# Patient Record
Sex: FEMALE | Race: WHITE | NOT HISPANIC OR LATINO | Employment: FULL TIME | URBAN - NONMETROPOLITAN AREA
[De-identification: names, ages, dates, MRNs, and addresses within clinical notes are randomized per-mention and may not be internally consistent; named-entity substitution may affect disease eponyms.]

---

## 2017-05-23 ENCOUNTER — APPOINTMENT (OUTPATIENT)
Dept: LAB | Facility: HOSPITAL | Age: 57
End: 2017-05-23
Payer: COMMERCIAL

## 2017-05-23 ENCOUNTER — TRANSCRIBE ORDERS (OUTPATIENT)
Dept: ADMINISTRATIVE | Facility: HOSPITAL | Age: 57
End: 2017-05-23

## 2017-05-23 DIAGNOSIS — B34.9 VIRAL SYNDROME: ICD-10-CM

## 2017-05-23 DIAGNOSIS — R53.83 FATIGUE, UNSPECIFIED TYPE: Primary | ICD-10-CM

## 2017-05-23 DIAGNOSIS — R53.83 FATIGUE, UNSPECIFIED TYPE: ICD-10-CM

## 2017-05-23 LAB
25(OH)D3 SERPL-MCNC: 16.5 NG/ML (ref 30–100)
ALBUMIN SERPL BCP-MCNC: 4.5 G/DL (ref 3.5–5)
ALP SERPL-CCNC: 41 U/L (ref 46–116)
ALT SERPL W P-5'-P-CCNC: 25 U/L (ref 12–78)
ANION GAP SERPL CALCULATED.3IONS-SCNC: 8 MMOL/L (ref 4–13)
AST SERPL W P-5'-P-CCNC: 23 U/L (ref 5–45)
BILIRUB SERPL-MCNC: 0.4 MG/DL (ref 0.2–1)
BUN SERPL-MCNC: 14 MG/DL (ref 5–25)
CALCIUM SERPL-MCNC: 9.3 MG/DL (ref 8.3–10.1)
CHLORIDE SERPL-SCNC: 101 MMOL/L (ref 100–108)
CO2 SERPL-SCNC: 31 MMOL/L (ref 21–32)
CREAT SERPL-MCNC: 0.77 MG/DL (ref 0.6–1.3)
GFR SERPL CREATININE-BSD FRML MDRD: >60 ML/MIN/1.73SQ M
GLUCOSE SERPL-MCNC: 90 MG/DL (ref 65–140)
POTASSIUM SERPL-SCNC: 4.2 MMOL/L (ref 3.5–5.3)
PROT SERPL-MCNC: 7.4 G/DL (ref 6.4–8.2)
SODIUM SERPL-SCNC: 140 MMOL/L (ref 136–145)
TSH SERPL DL<=0.05 MIU/L-ACNC: 2.97 UIU/ML (ref 0.36–3.74)

## 2017-05-23 PROCEDURE — 86618 LYME DISEASE ANTIBODY: CPT

## 2017-05-23 PROCEDURE — 86645 CMV ANTIBODY IGM: CPT

## 2017-05-23 PROCEDURE — 86664 EPSTEIN-BARR NUCLEAR ANTIGEN: CPT

## 2017-05-23 PROCEDURE — 86665 EPSTEIN-BARR CAPSID VCA: CPT

## 2017-05-23 PROCEDURE — 82306 VITAMIN D 25 HYDROXY: CPT

## 2017-05-23 PROCEDURE — 36415 COLL VENOUS BLD VENIPUNCTURE: CPT

## 2017-05-23 PROCEDURE — 80053 COMPREHEN METABOLIC PANEL: CPT

## 2017-05-23 PROCEDURE — 86617 LYME DISEASE ANTIBODY: CPT

## 2017-05-23 PROCEDURE — 86738 MYCOPLASMA ANTIBODY: CPT

## 2017-05-23 PROCEDURE — 86644 CMV ANTIBODY: CPT

## 2017-05-23 PROCEDURE — 86663 EPSTEIN-BARR ANTIBODY: CPT

## 2017-05-23 PROCEDURE — 84443 ASSAY THYROID STIM HORMONE: CPT

## 2017-05-24 LAB
B BURGDOR IGG SER IA-ACNC: 0.11
B BURGDOR IGM SER IA-ACNC: 1.34

## 2017-05-25 LAB
CMV IGG SERPL IA-ACNC: <0.6 U/ML (ref 0–0.59)
CMV IGM SERPL IA-ACNC: <30 AU/ML (ref 0–29.9)
EBV EA IGG SER-ACNC: 17.2 U/ML (ref 0–8.9)
EBV NA IGG SER IA-ACNC: >600 U/ML (ref 0–17.9)
EBV PATRN SPEC IB-IMP: ABNORMAL
EBV VCA IGG SER IA-ACNC: 215 U/ML (ref 0–17.9)
EBV VCA IGM SER IA-ACNC: <36 U/ML (ref 0–35.9)

## 2017-05-26 LAB
M PNEUMO IGG SER IA-ACNC: 116 U/ML (ref 0–99)
M PNEUMO IGM SER IA-ACNC: <770 U/ML (ref 0–769)

## 2017-05-27 LAB

## 2020-06-12 ENCOUNTER — OFFICE VISIT (OUTPATIENT)
Dept: FAMILY MEDICINE CLINIC | Facility: CLINIC | Age: 60
End: 2020-06-12
Payer: COMMERCIAL

## 2020-06-12 VITALS
TEMPERATURE: 98.7 F | WEIGHT: 133 LBS | BODY MASS INDEX: 22.71 KG/M2 | HEART RATE: 68 BPM | SYSTOLIC BLOOD PRESSURE: 124 MMHG | HEIGHT: 64 IN | DIASTOLIC BLOOD PRESSURE: 66 MMHG | RESPIRATION RATE: 16 BRPM

## 2020-06-12 DIAGNOSIS — D17.0 LIPOMA OF NECK: ICD-10-CM

## 2020-06-12 DIAGNOSIS — H66.90 ACUTE OTITIS MEDIA, UNSPECIFIED OTITIS MEDIA TYPE: Primary | ICD-10-CM

## 2020-06-12 PROCEDURE — 99213 OFFICE O/P EST LOW 20 MIN: CPT | Performed by: FAMILY MEDICINE

## 2020-06-12 PROCEDURE — 1036F TOBACCO NON-USER: CPT | Performed by: FAMILY MEDICINE

## 2020-06-12 PROCEDURE — 3008F BODY MASS INDEX DOCD: CPT | Performed by: FAMILY MEDICINE

## 2020-06-12 RX ORDER — AZITHROMYCIN 250 MG/1
TABLET, FILM COATED ORAL
Qty: 6 TABLET | Refills: 0 | Status: SHIPPED | OUTPATIENT
Start: 2020-06-12 | End: 2020-06-17

## 2020-06-12 RX ORDER — METHYLPREDNISOLONE 4 MG/1
TABLET ORAL
Qty: 21 EACH | Refills: 0 | Status: SHIPPED | OUTPATIENT
Start: 2020-06-12 | End: 2020-07-24

## 2020-06-29 DIAGNOSIS — D17.0 LIPOMA OF NECK: Primary | ICD-10-CM

## 2020-07-07 ENCOUNTER — CONSULT (OUTPATIENT)
Dept: SURGERY | Facility: CLINIC | Age: 60
End: 2020-07-07
Payer: COMMERCIAL

## 2020-07-07 VITALS
SYSTOLIC BLOOD PRESSURE: 128 MMHG | WEIGHT: 134 LBS | HEART RATE: 84 BPM | BODY MASS INDEX: 22.88 KG/M2 | HEIGHT: 64 IN | DIASTOLIC BLOOD PRESSURE: 79 MMHG | TEMPERATURE: 98.6 F

## 2020-07-07 DIAGNOSIS — R22.9 MASS OF SUBCUTANEOUS TISSUE: Primary | ICD-10-CM

## 2020-07-07 DIAGNOSIS — D17.0 LIPOMA OF NECK: ICD-10-CM

## 2020-07-07 PROBLEM — D17.21 LIPOMA OF RIGHT SHOULDER: Status: ACTIVE | Noted: 2020-07-07

## 2020-07-07 PROCEDURE — 99244 OFF/OP CNSLTJ NEW/EST MOD 40: CPT | Performed by: SURGERY

## 2020-07-07 RX ORDER — SODIUM CHLORIDE, SODIUM LACTATE, POTASSIUM CHLORIDE, CALCIUM CHLORIDE 600; 310; 30; 20 MG/100ML; MG/100ML; MG/100ML; MG/100ML
125 INJECTION, SOLUTION INTRAVENOUS CONTINUOUS
Status: CANCELLED | OUTPATIENT
Start: 2020-07-07

## 2020-07-07 RX ORDER — CHLORHEXIDINE GLUCONATE 4 G/100ML
SOLUTION TOPICAL DAILY PRN
Status: CANCELLED | OUTPATIENT
Start: 2020-07-07

## 2020-07-07 RX ORDER — ALBUTEROL SULFATE 90 UG/1
AEROSOL, METERED RESPIRATORY (INHALATION)
COMMUNITY
Start: 2016-08-24 | End: 2021-04-01 | Stop reason: SDUPTHER

## 2020-07-08 NOTE — H&P (VIEW-ONLY)
Assessment/Plan:    Mass of subcutaneous tissue of left posterior shoulder  Very large symptomatic subcutaneous mass, possibly lipoma, of the left posterior shoulder which is growing in size  Currently measures approximately 10 x 10 cm  No overlying skin changes  Does cause significant pain to the right scapula and right trapezius area  Patient wished to have this removed  I think given chronic pain requiring a cow apractic treatment plus the increase in size both merit the need for excisional biopsy  Patient will be scheduled for excision of subcutaneous mass  The procedure itself including all associated risks and benefits were discussed the patient great length  The patient verbalized understands risks is the way to proceed, consent was signed  Diagnoses and all orders for this visit:    Mass of subcutaneous tissue of left posterior shoulder    Lipoma of neck  -     Ambulatory referral to General Surgery    Other orders  -     albuterol (ProAir HFA) 90 mcg/act inhaler; Inhale          Subjective:      Patient ID: Toñito Florez is a 62 y o  female  73-year-old female with no significant past medical history, presents today in consultation for evaluation of enlarging subcutaneous mass of the right posterior shoulder  Patient states she has had this lesion for some time however recently with a past year is grown in size  She also has significant pain and discomfort surrounding lesion along her right shoulder blade and also along the right inferior portion of her neck and right shoulder  She currently sees a chiropractor with minimal improvement  At this point she is very concerned that is the mass that is can not contribute to her chronic pain  She denies any overlying skin changes  No drainage from the area  No similar masses in the past   Denies any other associated symptoms  This concerns her as it is growing in size over the past year  She would like it removed    Denies any other symptoms such as shortness of breath or chest pain  She is able to climb a set of stairs without any complication  She tolerates regular diet  No abdominal pain  No nausea vomiting  No fevers or chills  The following portions of the patient's history were reviewed and updated as appropriate:   She  has a past medical history of Allergies and Asthma  She   Patient Active Problem List    Diagnosis Date Noted    Mass of subcutaneous tissue of left posterior shoulder 07/07/2020     She  has a past surgical history that includes TONSILLECTOMY and Knee Arthroplasty  Her family history is not on file  She  reports that she has never smoked  She has never used smokeless tobacco  She reports that she does not use drugs  Her alcohol history is not on file  Current Outpatient Medications   Medication Sig Dispense Refill    albuterol (ProAir HFA) 90 mcg/act inhaler Inhale      budesonide (RINOCORT AQUA) 32 MCG/ACT nasal spray 1 spray into each nostril daily      methylPREDNISolone 4 MG tablet therapy pack Use as directed on package (Patient not taking: Reported on 7/7/2020) 21 each 0     No current facility-administered medications for this visit  She is allergic to albumen, egg; clarithromycin; penicillin g; penicillins; quinolones; and sulfa antibiotics       Review of Systems      A review systems was completed, all negative except as noted above HPI  Objective:      /79   Pulse 84   Temp 98 6 °F (37 °C)   Ht 5' 4" (1 626 m)   Wt 60 8 kg (134 lb)   BMI 23 00 kg/m²           Physical Exam   Constitutional: She is oriented to person, place, and time  She appears well-developed and well-nourished  No distress  HENT:   Head: Normocephalic and atraumatic  Eyes: No scleral icterus  Neck: Normal range of motion  No tracheal deviation present  Cardiovascular: Normal rate, regular rhythm and normal heart sounds  Exam reveals no gallop and no friction rub     No murmur heard   Pulmonary/Chest: Effort normal and breath sounds normal  No stridor  No respiratory distress  She has no wheezes  She has no rales  She exhibits no tenderness  Abdominal: Soft  Bowel sounds are normal  She exhibits no distension and no mass  There is no tenderness  There is no rebound and no guarding  Musculoskeletal: Normal range of motion  She exhibits no edema, tenderness or deformity  Neurological: She is alert and oriented to person, place, and time  No cranial nerve deficit  Coordination normal    Skin: Skin is warm  No rash noted  She is not diaphoretic  No erythema  No pallor  A 10 x 10 cm subcutaneous mass of the right posterior shoulder noted  Tender to palpation mostly in the inferior aspect just above the spine of this right scapula  Mildly fixed  No overlying skin changes  Psychiatric: She has a normal mood and affect  Her behavior is normal  Judgment and thought content normal    Vitals reviewed

## 2020-07-08 NOTE — ASSESSMENT & PLAN NOTE
Very large symptomatic subcutaneous mass, possibly lipoma, of the left posterior shoulder which is growing in size  Currently measures approximately 10 x 10 cm  No overlying skin changes  Does cause significant pain to the right scapula and right trapezius area  Patient wished to have this removed  I think given chronic pain requiring a cow apractic treatment plus the increase in size both merit the need for excisional biopsy  Patient will be scheduled for excision of subcutaneous mass  The procedure itself including all associated risks and benefits were discussed the patient great length  The patient verbalized understands risks is the way to proceed, consent was signed

## 2020-07-08 NOTE — PROGRESS NOTES
Assessment/Plan:    Mass of subcutaneous tissue of left posterior shoulder  Very large symptomatic subcutaneous mass, possibly lipoma, of the left posterior shoulder which is growing in size  Currently measures approximately 10 x 10 cm  No overlying skin changes  Does cause significant pain to the right scapula and right trapezius area  Patient wished to have this removed  I think given chronic pain requiring a cow apractic treatment plus the increase in size both merit the need for excisional biopsy  Patient will be scheduled for excision of subcutaneous mass  The procedure itself including all associated risks and benefits were discussed the patient great length  The patient verbalized understands risks is the way to proceed, consent was signed  Diagnoses and all orders for this visit:    Mass of subcutaneous tissue of left posterior shoulder    Lipoma of neck  -     Ambulatory referral to General Surgery    Other orders  -     albuterol (ProAir HFA) 90 mcg/act inhaler; Inhale          Subjective:      Patient ID: Brittany Abarca is a 62 y o  female  28-year-old female with no significant past medical history, presents today in consultation for evaluation of enlarging subcutaneous mass of the right posterior shoulder  Patient states she has had this lesion for some time however recently with a past year is grown in size  She also has significant pain and discomfort surrounding lesion along her right shoulder blade and also along the right inferior portion of her neck and right shoulder  She currently sees a chiropractor with minimal improvement  At this point she is very concerned that is the mass that is can not contribute to her chronic pain  She denies any overlying skin changes  No drainage from the area  No similar masses in the past   Denies any other associated symptoms  This concerns her as it is growing in size over the past year  She would like it removed    Denies any other symptoms such as shortness of breath or chest pain  She is able to climb a set of stairs without any complication  She tolerates regular diet  No abdominal pain  No nausea vomiting  No fevers or chills  The following portions of the patient's history were reviewed and updated as appropriate:   She  has a past medical history of Allergies and Asthma  She   Patient Active Problem List    Diagnosis Date Noted    Mass of subcutaneous tissue of left posterior shoulder 07/07/2020     She  has a past surgical history that includes TONSILLECTOMY and Knee Arthroplasty  Her family history is not on file  She  reports that she has never smoked  She has never used smokeless tobacco  She reports that she does not use drugs  Her alcohol history is not on file  Current Outpatient Medications   Medication Sig Dispense Refill    albuterol (ProAir HFA) 90 mcg/act inhaler Inhale      budesonide (RINOCORT AQUA) 32 MCG/ACT nasal spray 1 spray into each nostril daily      methylPREDNISolone 4 MG tablet therapy pack Use as directed on package (Patient not taking: Reported on 7/7/2020) 21 each 0     No current facility-administered medications for this visit  She is allergic to albumen, egg; clarithromycin; penicillin g; penicillins; quinolones; and sulfa antibiotics       Review of Systems      A review systems was completed, all negative except as noted above HPI  Objective:      /79   Pulse 84   Temp 98 6 °F (37 °C)   Ht 5' 4" (1 626 m)   Wt 60 8 kg (134 lb)   BMI 23 00 kg/m²          Physical Exam   Constitutional: She is oriented to person, place, and time  She appears well-developed and well-nourished  No distress  HENT:   Head: Normocephalic and atraumatic  Eyes: No scleral icterus  Neck: Normal range of motion  No tracheal deviation present  Cardiovascular: Normal rate, regular rhythm and normal heart sounds  Exam reveals no gallop and no friction rub  No murmur heard    Pulmonary/Chest: Effort normal and breath sounds normal  No stridor  No respiratory distress  She has no wheezes  She has no rales  She exhibits no tenderness  Abdominal: Soft  Bowel sounds are normal  She exhibits no distension and no mass  There is no tenderness  There is no rebound and no guarding  Musculoskeletal: Normal range of motion  She exhibits no edema, tenderness or deformity  Neurological: She is alert and oriented to person, place, and time  No cranial nerve deficit  Coordination normal    Skin: Skin is warm  No rash noted  She is not diaphoretic  No erythema  No pallor  A 10 x 10 cm subcutaneous mass of the right posterior shoulder noted  Tender to palpation mostly in the inferior aspect just above the spine of this right scapula  Mildly fixed  No overlying skin changes  Psychiatric: She has a normal mood and affect  Her behavior is normal  Judgment and thought content normal    Vitals reviewed

## 2020-07-08 NOTE — H&P
Assessment/Plan:    Mass of subcutaneous tissue of left posterior shoulder  Very large symptomatic subcutaneous mass, possibly lipoma, of the left posterior shoulder which is growing in size  Currently measures approximately 10 x 10 cm  No overlying skin changes  Does cause significant pain to the right scapula and right trapezius area  Patient wished to have this removed  I think given chronic pain requiring a cow apractic treatment plus the increase in size both merit the need for excisional biopsy  Patient will be scheduled for excision of subcutaneous mass  The procedure itself including all associated risks and benefits were discussed the patient great length  The patient verbalized understands risks is the way to proceed, consent was signed  Diagnoses and all orders for this visit:    Mass of subcutaneous tissue of left posterior shoulder    Lipoma of neck  -     Ambulatory referral to General Surgery    Other orders  -     albuterol (ProAir HFA) 90 mcg/act inhaler; Inhale          Subjective:      Patient ID: Matt Contreras is a 62 y o  female  18-year-old female with no significant past medical history, presents today in consultation for evaluation of enlarging subcutaneous mass of the right posterior shoulder  Patient states she has had this lesion for some time however recently with a past year is grown in size  She also has significant pain and discomfort surrounding lesion along her right shoulder blade and also along the right inferior portion of her neck and right shoulder  She currently sees a chiropractor with minimal improvement  At this point she is very concerned that is the mass that is can not contribute to her chronic pain  She denies any overlying skin changes  No drainage from the area  No similar masses in the past   Denies any other associated symptoms  This concerns her as it is growing in size over the past year  She would like it removed    Denies any other symptoms such as shortness of breath or chest pain  She is able to climb a set of stairs without any complication  She tolerates regular diet  No abdominal pain  No nausea vomiting  No fevers or chills  The following portions of the patient's history were reviewed and updated as appropriate:   She  has a past medical history of Allergies and Asthma  She   Patient Active Problem List    Diagnosis Date Noted    Mass of subcutaneous tissue of left posterior shoulder 07/07/2020     She  has a past surgical history that includes TONSILLECTOMY and Knee Arthroplasty  Her family history is not on file  She  reports that she has never smoked  She has never used smokeless tobacco  She reports that she does not use drugs  Her alcohol history is not on file  Current Outpatient Medications   Medication Sig Dispense Refill    albuterol (ProAir HFA) 90 mcg/act inhaler Inhale      budesonide (RINOCORT AQUA) 32 MCG/ACT nasal spray 1 spray into each nostril daily      methylPREDNISolone 4 MG tablet therapy pack Use as directed on package (Patient not taking: Reported on 7/7/2020) 21 each 0     No current facility-administered medications for this visit  She is allergic to albumen, egg; clarithromycin; penicillin g; penicillins; quinolones; and sulfa antibiotics       Review of Systems      A review systems was completed, all negative except as noted above HPI  Objective:      /79   Pulse 84   Temp 98 6 °F (37 °C)   Ht 5' 4" (1 626 m)   Wt 60 8 kg (134 lb)   BMI 23 00 kg/m²           Physical Exam   Constitutional: She is oriented to person, place, and time  She appears well-developed and well-nourished  No distress  HENT:   Head: Normocephalic and atraumatic  Eyes: No scleral icterus  Neck: Normal range of motion  No tracheal deviation present  Cardiovascular: Normal rate, regular rhythm and normal heart sounds  Exam reveals no gallop and no friction rub     No murmur heard   Pulmonary/Chest: Effort normal and breath sounds normal  No stridor  No respiratory distress  She has no wheezes  She has no rales  She exhibits no tenderness  Abdominal: Soft  Bowel sounds are normal  She exhibits no distension and no mass  There is no tenderness  There is no rebound and no guarding  Musculoskeletal: Normal range of motion  She exhibits no edema, tenderness or deformity  Neurological: She is alert and oriented to person, place, and time  No cranial nerve deficit  Coordination normal    Skin: Skin is warm  No rash noted  She is not diaphoretic  No erythema  No pallor  A 10 x 10 cm subcutaneous mass of the right posterior shoulder noted  Tender to palpation mostly in the inferior aspect just above the spine of this right scapula  Mildly fixed  No overlying skin changes  Psychiatric: She has a normal mood and affect  Her behavior is normal  Judgment and thought content normal    Vitals reviewed

## 2020-07-20 ENCOUNTER — HOSPITAL ENCOUNTER (OUTPATIENT)
Dept: NON INVASIVE DIAGNOSTICS | Facility: HOSPITAL | Age: 60
Discharge: HOME | End: 2020-07-20
Attending: SURGERY
Payer: COMMERCIAL

## 2020-07-20 DIAGNOSIS — R22.9 MASS OF SUBCUTANEOUS TISSUE: ICD-10-CM

## 2020-07-20 PROCEDURE — U0003 INFECTIOUS AGENT DETECTION BY NUCLEIC ACID (DNA OR RNA); SEVERE ACUTE RESPIRATORY SYNDROME CORONAVIRUS 2 (SARS-COV-2) (CORONAVIRUS DISEASE [COVID-19]), AMPLIFIED PROBE TECHNIQUE, MAKING USE OF HIGH THROUGHPUT TECHNOLOGIES AS DESCRIBED BY CMS-2020-01-R: HCPCS

## 2020-07-20 PROCEDURE — 93005 ELECTROCARDIOGRAM TRACING: CPT

## 2020-07-21 LAB
ATRIAL RATE: 79 BPM
P AXIS: 64 DEGREES
PR INTERVAL: 142 MS
QRS AXIS: 86 DEGREES
QRSD INTERVAL: 108 MS
QT INTERVAL: 380 MS
QTC INTERVAL: 435 MS
T WAVE AXIS: 42 DEGREES
VENTRICULAR RATE: 79 BPM

## 2020-07-21 PROCEDURE — 93010 ELECTROCARDIOGRAM REPORT: CPT | Performed by: INTERNAL MEDICINE

## 2020-07-22 LAB
INPATIENT: NORMAL
SARS-COV-2 RNA SPEC QL NAA+PROBE: NOT DETECTED

## 2020-07-24 RX ORDER — CLOTRIMAZOLE 1 G/ML
SOLUTION TOPICAL 2 TIMES DAILY
COMMUNITY

## 2020-07-24 RX ORDER — ESTRADIOL 0.1 MG/G
2 CREAM VAGINAL DAILY
COMMUNITY

## 2020-07-24 NOTE — PRE-PROCEDURE INSTRUCTIONS
Pre-Surgery Instructions:   Medication Instructions    albuterol (ProAir HFA) 90 mcg/act inhaler Instructed patient per Anesthesia Guidelines   budesonide (RINOCORT AQUA) 32 MCG/ACT nasal spray Instructed patient per Anesthesia Guidelines   clotrimazole 1 % external solution Instructed patient per Anesthesia Guidelines   estradiol (ESTRACE) 0 1 mg/g vaginal cream Instructed patient per Anesthesia Guidelines

## 2020-07-27 ENCOUNTER — ANESTHESIA EVENT (OUTPATIENT)
Dept: PERIOP | Facility: HOSPITAL | Age: 60
End: 2020-07-27
Payer: COMMERCIAL

## 2020-07-28 ENCOUNTER — ANESTHESIA (OUTPATIENT)
Dept: PERIOP | Facility: HOSPITAL | Age: 60
End: 2020-07-28
Payer: COMMERCIAL

## 2020-07-28 ENCOUNTER — HOSPITAL ENCOUNTER (OUTPATIENT)
Facility: HOSPITAL | Age: 60
Setting detail: OUTPATIENT SURGERY
Discharge: HOME | End: 2020-07-28
Attending: SURGERY | Admitting: SURGERY
Payer: COMMERCIAL

## 2020-07-28 VITALS
RESPIRATION RATE: 18 BRPM | OXYGEN SATURATION: 98 % | TEMPERATURE: 98.4 F | SYSTOLIC BLOOD PRESSURE: 118 MMHG | DIASTOLIC BLOOD PRESSURE: 58 MMHG | HEART RATE: 78 BPM

## 2020-07-28 DIAGNOSIS — R22.9 MASS OF SUBCUTANEOUS TISSUE: ICD-10-CM

## 2020-07-28 PROCEDURE — 23073 EXC SHOULDER TUM DEEP 5 CM/>: CPT | Performed by: SURGERY

## 2020-07-28 PROCEDURE — 88304 TISSUE EXAM BY PATHOLOGIST: CPT | Performed by: PATHOLOGY

## 2020-07-28 PROCEDURE — 23073 EXC SHOULDER TUM DEEP 5 CM/>: CPT | Performed by: PHYSICIAN ASSISTANT

## 2020-07-28 RX ORDER — BUPIVACAINE HYDROCHLORIDE 5 MG/ML
INJECTION, SOLUTION EPIDURAL; INTRACAUDAL AS NEEDED
Status: DISCONTINUED | OUTPATIENT
Start: 2020-07-28 | End: 2020-07-28 | Stop reason: HOSPADM

## 2020-07-28 RX ORDER — MEPERIDINE HYDROCHLORIDE 25 MG/ML
25 INJECTION INTRAMUSCULAR; INTRAVENOUS; SUBCUTANEOUS ONCE AS NEEDED
Status: DISCONTINUED | OUTPATIENT
Start: 2020-07-28 | End: 2020-07-28 | Stop reason: HOSPADM

## 2020-07-28 RX ORDER — DEXAMETHASONE SODIUM PHOSPHATE 10 MG/ML
INJECTION, SOLUTION INTRAMUSCULAR; INTRAVENOUS AS NEEDED
Status: DISCONTINUED | OUTPATIENT
Start: 2020-07-28 | End: 2020-07-28 | Stop reason: SURG

## 2020-07-28 RX ORDER — MIDAZOLAM HYDROCHLORIDE 2 MG/2ML
INJECTION, SOLUTION INTRAMUSCULAR; INTRAVENOUS AS NEEDED
Status: DISCONTINUED | OUTPATIENT
Start: 2020-07-28 | End: 2020-07-28 | Stop reason: SURG

## 2020-07-28 RX ORDER — ONDANSETRON 2 MG/ML
INJECTION INTRAMUSCULAR; INTRAVENOUS AS NEEDED
Status: DISCONTINUED | OUTPATIENT
Start: 2020-07-28 | End: 2020-07-28 | Stop reason: SURG

## 2020-07-28 RX ORDER — FENTANYL CITRATE/PF 50 MCG/ML
25 SYRINGE (ML) INJECTION
Status: DISCONTINUED | OUTPATIENT
Start: 2020-07-28 | End: 2020-07-28 | Stop reason: HOSPADM

## 2020-07-28 RX ORDER — SODIUM CHLORIDE, SODIUM LACTATE, POTASSIUM CHLORIDE, CALCIUM CHLORIDE 600; 310; 30; 20 MG/100ML; MG/100ML; MG/100ML; MG/100ML
125 INJECTION, SOLUTION INTRAVENOUS CONTINUOUS
Status: DISCONTINUED | OUTPATIENT
Start: 2020-07-28 | End: 2020-07-28 | Stop reason: HOSPADM

## 2020-07-28 RX ORDER — CHLORHEXIDINE GLUCONATE 4 G/100ML
SOLUTION TOPICAL DAILY PRN
Status: DISCONTINUED | OUTPATIENT
Start: 2020-07-28 | End: 2020-07-28 | Stop reason: HOSPADM

## 2020-07-28 RX ORDER — CLINDAMYCIN PHOSPHATE 900 MG/50ML
900 INJECTION INTRAVENOUS ONCE
Status: COMPLETED | OUTPATIENT
Start: 2020-07-28 | End: 2020-07-28

## 2020-07-28 RX ORDER — LIDOCAINE HYDROCHLORIDE 10 MG/ML
0.5 INJECTION, SOLUTION EPIDURAL; INFILTRATION; INTRACAUDAL; PERINEURAL ONCE AS NEEDED
Status: DISCONTINUED | OUTPATIENT
Start: 2020-07-28 | End: 2020-07-28 | Stop reason: HOSPADM

## 2020-07-28 RX ORDER — EPHEDRINE SULFATE 50 MG/ML
INJECTION INTRAVENOUS AS NEEDED
Status: DISCONTINUED | OUTPATIENT
Start: 2020-07-28 | End: 2020-07-28 | Stop reason: SURG

## 2020-07-28 RX ORDER — OXYCODONE HYDROCHLORIDE AND ACETAMINOPHEN 5; 325 MG/1; MG/1
2 TABLET ORAL EVERY 4 HOURS PRN
Status: DISCONTINUED | OUTPATIENT
Start: 2020-07-28 | End: 2020-07-28 | Stop reason: HOSPADM

## 2020-07-28 RX ORDER — FENTANYL CITRATE 50 UG/ML
INJECTION, SOLUTION INTRAMUSCULAR; INTRAVENOUS AS NEEDED
Status: DISCONTINUED | OUTPATIENT
Start: 2020-07-28 | End: 2020-07-28 | Stop reason: SURG

## 2020-07-28 RX ORDER — PROPOFOL 10 MG/ML
INJECTION, EMULSION INTRAVENOUS AS NEEDED
Status: DISCONTINUED | OUTPATIENT
Start: 2020-07-28 | End: 2020-07-28 | Stop reason: SURG

## 2020-07-28 RX ORDER — LIDOCAINE HYDROCHLORIDE 10 MG/ML
INJECTION, SOLUTION EPIDURAL; INFILTRATION; INTRACAUDAL; PERINEURAL AS NEEDED
Status: DISCONTINUED | OUTPATIENT
Start: 2020-07-28 | End: 2020-07-28 | Stop reason: SURG

## 2020-07-28 RX ADMIN — PROPOFOL 170 MG: 10 INJECTION, EMULSION INTRAVENOUS at 10:47

## 2020-07-28 RX ADMIN — EPHEDRINE SULFATE 5 MG: 50 INJECTION, SOLUTION INTRAVENOUS at 10:57

## 2020-07-28 RX ADMIN — EPHEDRINE SULFATE 5 MG: 50 INJECTION, SOLUTION INTRAVENOUS at 11:00

## 2020-07-28 RX ADMIN — CLINDAMYCIN PHOSPHATE 900 MG: 18 INJECTION, SOLUTION INTRAMUSCULAR; INTRAVENOUS at 10:40

## 2020-07-28 RX ADMIN — DEXAMETHASONE SODIUM PHOSPHATE 10 MG: 10 INJECTION, SOLUTION INTRAMUSCULAR; INTRAVENOUS at 10:57

## 2020-07-28 RX ADMIN — PHENYLEPHRINE HYDROCHLORIDE 100 MCG: 10 INJECTION INTRAVENOUS at 11:31

## 2020-07-28 RX ADMIN — PHENYLEPHRINE HYDROCHLORIDE 100 MCG: 10 INJECTION INTRAVENOUS at 11:00

## 2020-07-28 RX ADMIN — EPHEDRINE SULFATE 10 MG: 50 INJECTION, SOLUTION INTRAVENOUS at 11:22

## 2020-07-28 RX ADMIN — MIDAZOLAM HYDROCHLORIDE 2 MG: 1 INJECTION, SOLUTION INTRAMUSCULAR; INTRAVENOUS at 10:44

## 2020-07-28 RX ADMIN — ONDANSETRON HYDROCHLORIDE 4 MG: 2 INJECTION, SOLUTION INTRAVENOUS at 11:31

## 2020-07-28 RX ADMIN — FENTANYL CITRATE 25 MCG: 50 INJECTION, SOLUTION INTRAMUSCULAR; INTRAVENOUS at 10:59

## 2020-07-28 RX ADMIN — LIDOCAINE HYDROCHLORIDE 50 MG: 10 INJECTION, SOLUTION EPIDURAL; INFILTRATION; INTRACAUDAL; PERINEURAL at 10:47

## 2020-07-28 RX ADMIN — FENTANYL CITRATE 25 MCG: 50 INJECTION, SOLUTION INTRAMUSCULAR; INTRAVENOUS at 11:17

## 2020-07-28 RX ADMIN — SODIUM CHLORIDE, SODIUM LACTATE, POTASSIUM CHLORIDE, AND CALCIUM CHLORIDE 125 ML/HR: .6; .31; .03; .02 INJECTION, SOLUTION INTRAVENOUS at 10:26

## 2020-07-28 NOTE — ANESTHESIA POSTPROCEDURE EVALUATION
Post-Op Assessment Note    CV Status:  Stable  Pain Score: 0    Pain management: adequate     Mental Status:  Arousable   Hydration Status:  Stable   PONV Controlled:  None   Airway Patency:  Patent   Post Op Vitals Reviewed: Yes      Staff: CRNA           BP   122/57   Temp  97 1   Pulse  99   Resp   14   SpO2   99%

## 2020-07-28 NOTE — OP NOTE
OPERATIVE REPORT  PATIENT NAME: Johnnette Mcardle    :  1960  MRN: [id number]  Pt Location: [facility] OR ROOM 01    SURGERY DATE: 2020    Surgeon(s) and Role: Khadra Dupree DO - Primary     * Bairon Escobar PA-C - Assisting    Preop Diagnosis:  Mass of subcutaneous tissue [R22 9]    Post-Op Diagnosis Codes:     * Mass of subcutaneous tissue [R22 9]    Procedure(s) (LRB):  EXCISION BIOPSY TISSUE LESION/MASS right posterior shoulder (Right)    Specimen(s):  ID Type Source Tests Collected by Time Destination   1 : right posterior shoulder lipoma Tissue Joint, Right Shoulder TISSUE EXAM Karl Arroyo DO 2020 1110        Estimated Blood Loss:   Minimal    Drains:  * No LDAs found *    Anesthesia Type:   General/LMA    Operative Indications: Mass of subcutaneous tissue [R22 9]      Operative Findings:  Right posterior shoulder intramuscular lipoma measuring 11 cm    Complications:   None    Procedure and Technique:  Patient brought up room and placed in supine position operative all the regular monitor devices were then connected  The patient underwent general anesthesia with LMA intubation without complication  Patient was then subsequently repositioned to lateral decubitus with the right side up  All pressure points were padded and the LMA tube was re-evaluate make sure it was in appropriate position  The patient was then prepped draped usual sterile fashion  A time-out was performed to verify correct patient, procedure, position, and site  Approximately 4 cm incision was made in oblique fashion overlying the palpable mass  This was done with 15 blade scalpel carried down to the dura deep dermis and subcu the subcutaneous tissue using Bovie electrocautery  The point time a bright yellow lipomatous type lesion came to clear view  Gentle pressure portion lesion a serrated  However there is significant adhesions to the underlying tissue and also ingrowth into the underlying muscle  Dissection continued circumferentially taking down the adhesions  Slowly and surely the lipomatous mass was freed up  The incision had to be lengthened approximately 2 cm to allow for further dissection  Unfortunately the lipoma was completely circumferentially dissected came out in a piecemeal fashion  It measured approximately 11 cm  The cavity was then irrigated with normal saline  Hemostasis was achieved with direct pressure and any bleeding points were taken care by Bovie electrocautery  The subcutaneous tissue was then approximated using interrupted 3-0 Vicryl sutures  The skin was then approximated using running 4 Monocryl  Steri-Strips and a dry sterile pressure dressing was then placed  Patient tolerated procedure well was taken to the postanesthesia care unit stable condition  All lap, needle, and instrument counts were correct       I was present for the entire procedure, A qualified resident physician was not available and A physician assistant was required during the procedure for retraction tissue handling,dissection and suturing    Patient Disposition:  PACU     SIGNATURE: Kelsey Kendall DO  DATE: July 28, 2020  TIME: 11:40 AM

## 2020-07-28 NOTE — DISCHARGE INSTRUCTIONS
Follow-up Dr Misty Rivas in 2 weeks per point  Her that as tolerated  Low intensity activity as tolerated, no heavy lifting, nothing greater than 20 lb for 2 weeks with the right arm  Dressing may be removed on Thursday  You may shower on Thursday  No baths or swimming  Vital fever, worsening pain, redness or drainage from incision the colon office or go to the ER

## 2020-07-28 NOTE — INTERVAL H&P NOTE
H&P reviewed  After examining the patient I find no changes in the patients condition since the H&P had been written      Vitals:    07/28/20 1017   BP: 152/67   Pulse: 78   Resp: 18   Temp: (!) 96 9 °F (36 1 °C)   SpO2: 98%

## 2020-07-28 NOTE — ANESTHESIA PREPROCEDURE EVALUATION
Review of Systems/Medical History  Patient summary reviewed  Chart reviewed  No history of anesthetic complications     Cardiovascular  EKG reviewed,    Pulmonary  Asthma , well controlled/ stable ,        GI/Hepatic  Negative GI/hepatic ROS          Negative  ROS        Endo/Other  Negative endo/other ROS      GYN  Negative gynecology ROS          Hematology  Negative hematology ROS      Musculoskeletal    Comment: Rt shoulder area mass  Neurology  Negative neurology ROS      Psychology   Negative psychology ROS              Physical Exam    Airway    Mallampati score: I  TM Distance: >3 FB  Neck ROM: full     Dental   No notable dental hx     Cardiovascular      Pulmonary      Other Findings        Anesthesia Plan  ASA Score- 2     Anesthesia Type- general with ASA Monitors  Additional Monitors:   Airway Plan: LMA  Comment: Position for surgery should be safe for LMA  Plan Factors-  Patient did not smoke on day of surgery  Induction-     Postoperative Plan-     Informed Consent- Anesthetic plan and risks discussed with patient  I personally reviewed this patient with the CRNA  Discussed and agreed on the Anesthesia Plan with the CRNA  Ayesha Ayers

## 2020-08-10 ENCOUNTER — OFFICE VISIT (OUTPATIENT)
Dept: SURGERY | Facility: CLINIC | Age: 60
End: 2020-08-10

## 2020-08-10 VITALS
SYSTOLIC BLOOD PRESSURE: 113 MMHG | BODY MASS INDEX: 22.88 KG/M2 | DIASTOLIC BLOOD PRESSURE: 72 MMHG | WEIGHT: 134 LBS | HEIGHT: 64 IN | HEART RATE: 73 BPM | TEMPERATURE: 98.2 F

## 2020-08-10 DIAGNOSIS — R22.9 MASS OF SUBCUTANEOUS TISSUE: Primary | ICD-10-CM

## 2020-08-10 PROCEDURE — 3008F BODY MASS INDEX DOCD: CPT | Performed by: SURGERY

## 2020-08-10 PROCEDURE — 99024 POSTOP FOLLOW-UP VISIT: CPT | Performed by: SURGERY

## 2020-08-10 NOTE — PROGRESS NOTES
Assessment/Plan/Follow up information       Diagnosis ICD-10-CM Associated Orders   1  Mass of subcutaneous tissue of right posterior shoulder  R22 9     Biopsy consistent with lipoma        All recent lab work and imagining reviewed on today's visit with patient, appropriate follow up was initiated if needed  Patient was counseled/education regarding their diagnosis, and the associated plan  They agreed with plan, all questions and concerns were answered/addressed  Advised to contact me or the office with any concerns or questions  In the event of an emergency, and unable to contact a provider they are to go to the emergency room  Subjective    HPI:  70-year-old male presents to the office for postop of a right posterior shoulder mass removed 07/28/2020  Biopsy came back positive for lipoma  Since time of surgery patient states she has been feeling well, has no acute complications however does endorse mild amount pain at the surgical incision site  No noted drainage is or any new issues/concerns  Patient states she is happy with the cosmetic appearance of the wound  Review of Systems   Constitutional: Negative for activity change, appetite change, chills, fatigue and fever  HENT: Negative for congestion, dental problem, drooling, ear discharge, ear pain, facial swelling, postnasal drip, rhinorrhea and sinus pain  Eyes: Negative for photophobia, pain, discharge and itching  Respiratory: Negative for apnea, cough, chest tightness and shortness of breath  Cardiovascular: Negative for chest pain and leg swelling  Gastrointestinal: Negative for abdominal distention, abdominal pain, anal bleeding, constipation, diarrhea and nausea  Endocrine: Negative for cold intolerance, heat intolerance and polydipsia  Genitourinary: Negative for difficulty urinating  Musculoskeletal: Negative for arthralgias, gait problem, joint swelling and myalgias          Right shoulder pain   Skin: Negative for color change and pallor  Allergic/Immunologic: Negative for immunocompromised state  Neurological: Negative for dizziness, seizures, facial asymmetry, weakness, light-headedness, numbness and headaches  Psychiatric/Behavioral: Negative for agitation, behavioral problems, confusion, decreased concentration and dysphoric mood  Objective    Vitals:    08/10/20 1356   BP: 113/72   Pulse: 73   Temp: 98 2 °F (36 8 °C)       Physical Exam  Constitutional:       General: She is not in acute distress  Appearance: She is well-developed  HENT:      Head: Normocephalic and atraumatic  Eyes:      Conjunctiva/sclera: Conjunctivae normal       Pupils: Pupils are equal, round, and reactive to light  Neck:      Musculoskeletal: Normal range of motion and neck supple  Cardiovascular:      Rate and Rhythm: Normal rate and regular rhythm  Heart sounds: Normal heart sounds  No murmur  No friction rub  Pulmonary:      Effort: Pulmonary effort is normal       Breath sounds: Normal breath sounds  Abdominal:      General: Bowel sounds are normal       Palpations: Abdomen is soft  Musculoskeletal: Normal range of motion  Skin:     General: Skin is warm  Capillary Refill: Capillary refill takes less than 2 seconds  Comments: Well-healing surgical incision as noted above  No active infection noted, no drainage, no open wounds, no swelling  Positive for mild amount of tenderness, no overlying skin color changes  No wound dehiscence   Neurological:      Mental Status: She is alert and oriented to person, place, and time  Motor: No abnormal muscle tone  Coordination: Coordination normal    Psychiatric:         Behavior: Behavior normal          Thought Content: Thought content normal             Portions of the record may have been created with voice recognition software   Occasional wrong word or "sound a like" substitutions may have occurred due to the inherent limitations of voice recognition software  Read the chart carefully and recognize, using context, where substitutions have occurred  Contact me with any questions         Ricardo Roberto MD 08/10/20

## 2020-09-28 ENCOUNTER — OFFICE VISIT (OUTPATIENT)
Dept: FAMILY MEDICINE CLINIC | Facility: CLINIC | Age: 60
End: 2020-09-28
Payer: COMMERCIAL

## 2020-09-28 VITALS
TEMPERATURE: 98 F | RESPIRATION RATE: 20 BRPM | SYSTOLIC BLOOD PRESSURE: 128 MMHG | HEART RATE: 68 BPM | HEIGHT: 64 IN | WEIGHT: 133 LBS | BODY MASS INDEX: 22.71 KG/M2 | DIASTOLIC BLOOD PRESSURE: 76 MMHG

## 2020-09-28 DIAGNOSIS — Z00.00 ANNUAL PHYSICAL EXAM: Primary | ICD-10-CM

## 2020-09-28 PROCEDURE — 99396 PREV VISIT EST AGE 40-64: CPT | Performed by: FAMILY MEDICINE

## 2020-09-28 PROCEDURE — 1036F TOBACCO NON-USER: CPT | Performed by: FAMILY MEDICINE

## 2020-09-28 NOTE — PROGRESS NOTES
Assessment/Plan:  Well visit    Problem List Items Addressed This Visit     None           There are no diagnoses linked to this encounter  No problem-specific Assessment & Plan notes found for this encounter  PHQ-9 Depression Screening    PHQ-9:    Frequency of the following problems over the past two weeks: Body mass index is 23 01 kg/m²  BMI Counseling: Body mass index is 23 01 kg/m²  The BMI     Subjective:      Patient ID: Mandy Anderson is a 62 y o  female  Presents for wellness visit      The following portions of the patient's history were reviewed and updated as appropriate:   She has a past medical history of Allergies and Asthma ,  does not have any pertinent problems on file  ,   has a past surgical history that includes TONSILLECTOMY; Knee Arthroplasty; Tonsillectomy; Sinus surgery; Knee arthroscopy (Right); and pr excision tumor soft tissue shoulder subq 3+cm (Right, 7/28/2020)  ,  family history is not on file  ,   reports that she has never smoked  She has never used smokeless tobacco  She reports that she does not use drugs  No history on file for alcohol ,  is allergic to albumen, egg; clarithromycin; penicillin g; penicillins; quinolones; and sulfa antibiotics     Current Outpatient Medications   Medication Sig Dispense Refill    albuterol (ProAir HFA) 90 mcg/act inhaler Inhale      budesonide (RINOCORT AQUA) 32 MCG/ACT nasal spray 1 spray into each nostril daily      estradiol (ESTRACE) 0 1 mg/g vaginal cream Insert 2 g into the vagina daily      clotrimazole 1 % external solution Apply topically 2 (two) times a day       No current facility-administered medications for this visit  Review of Systems   Constitutional: Negative  HENT: Negative  Eyes: Negative  Respiratory: Negative  Cardiovascular: Negative  Gastrointestinal: Negative  Endocrine: Negative  Genitourinary: Negative  Musculoskeletal: Negative  Skin: Negative  Allergic/Immunologic: Negative  Neurological: Negative  Hematological: Negative  Psychiatric/Behavioral: Negative  Objective:    /76   Pulse 68   Temp 98 °F (36 7 °C)   Resp 20   Ht 5' 3 75" (1 619 m)   Wt 60 3 kg (133 lb)   BMI 23 01 kg/m²   Body mass index is 23 01 kg/m²  Physical Exam  Constitutional:       Appearance: She is well-developed  HENT:      Head: Normocephalic  Eyes:      Pupils: Pupils are equal, round, and reactive to light  Neck:      Musculoskeletal: Normal range of motion  Cardiovascular:      Rate and Rhythm: Normal rate and regular rhythm  Heart sounds: Normal heart sounds  Pulmonary:      Effort: Pulmonary effort is normal       Breath sounds: Normal breath sounds  Abdominal:      General: Bowel sounds are normal       Palpations: Abdomen is soft  Tenderness: There is no abdominal tenderness  Skin:     General: Skin is warm  Neurological:      Mental Status: She is alert and oriented to person, place, and time

## 2020-10-12 ENCOUNTER — TRANSCRIBE ORDERS (OUTPATIENT)
Dept: ADMINISTRATIVE | Facility: HOSPITAL | Age: 60
End: 2020-10-12

## 2020-10-12 ENCOUNTER — TELEPHONE (OUTPATIENT)
Dept: FAMILY MEDICINE CLINIC | Facility: CLINIC | Age: 60
End: 2020-10-12

## 2020-10-12 DIAGNOSIS — Z12.31 ENCOUNTER FOR SCREENING MAMMOGRAM FOR MALIGNANT NEOPLASM OF BREAST: Primary | ICD-10-CM

## 2020-10-24 ENCOUNTER — HOSPITAL ENCOUNTER (OUTPATIENT)
Dept: MAMMOGRAPHY | Facility: HOSPITAL | Age: 60
Discharge: HOME | End: 2020-10-24
Payer: COMMERCIAL

## 2020-10-24 VITALS — WEIGHT: 133 LBS | HEIGHT: 64 IN | BODY MASS INDEX: 22.71 KG/M2

## 2020-10-24 DIAGNOSIS — Z12.31 ENCOUNTER FOR SCREENING MAMMOGRAM FOR MALIGNANT NEOPLASM OF BREAST: ICD-10-CM

## 2020-10-24 PROCEDURE — 77063 BREAST TOMOSYNTHESIS BI: CPT

## 2020-10-24 PROCEDURE — 77067 SCR MAMMO BI INCL CAD: CPT

## 2021-01-04 ENCOUNTER — TELEPHONE (OUTPATIENT)
Dept: ADMINISTRATIVE | Facility: OTHER | Age: 61
End: 2021-01-04

## 2021-01-04 NOTE — TELEPHONE ENCOUNTER
----- Message from Jacques Spears sent at 1/4/2021 12:46 PM EST -----  Regarding: Pap Smear  01/04/21 12:47 PM    Jose, our patient Shea Demarco has had Pap Smear (HPV) aka Cervical Cancer Screening completed/performed  Please assist in updating the patient chart by pulling the Care Everywhere (CE) document  The date of service is 06/23/2020       Thank you,  Jacques Spears  [facility]

## 2021-01-04 NOTE — TELEPHONE ENCOUNTER
Upon review of the In Basket request we were able to locate, review, and update the patient chart as requested for Pap Smear (HPV) aka Cervical Cancer Screening  Any additional questions or concerns should be emailed to the Practice Liaisons via [email or web address] email, please do not reply via In Basket      Thank you  Deng Almanza

## 2021-01-18 ENCOUNTER — OFFICE VISIT (OUTPATIENT)
Dept: FAMILY MEDICINE CLINIC | Facility: CLINIC | Age: 61
End: 2021-01-18
Payer: COMMERCIAL

## 2021-01-18 VITALS
DIASTOLIC BLOOD PRESSURE: 84 MMHG | HEIGHT: 64 IN | RESPIRATION RATE: 16 BRPM | WEIGHT: 140 LBS | SYSTOLIC BLOOD PRESSURE: 132 MMHG | HEART RATE: 64 BPM | BODY MASS INDEX: 23.9 KG/M2 | TEMPERATURE: 98.5 F

## 2021-01-18 DIAGNOSIS — E78.5 DYSLIPIDEMIA: ICD-10-CM

## 2021-01-18 DIAGNOSIS — L65.9 ALOPECIA: Primary | ICD-10-CM

## 2021-01-18 DIAGNOSIS — Z13.29 SCREENING FOR THYROID DISORDER: ICD-10-CM

## 2021-01-18 DIAGNOSIS — Z13.220 SCREENING FOR CHOLESTEROL LEVEL: ICD-10-CM

## 2021-01-18 DIAGNOSIS — Z13.0 SCREENING FOR DEFICIENCY ANEMIA: ICD-10-CM

## 2021-01-18 DIAGNOSIS — E55.9 VITAMIN D DEFICIENCY: ICD-10-CM

## 2021-01-18 PROCEDURE — 99213 OFFICE O/P EST LOW 20 MIN: CPT | Performed by: FAMILY MEDICINE

## 2021-01-18 PROCEDURE — 1036F TOBACCO NON-USER: CPT | Performed by: FAMILY MEDICINE

## 2021-01-18 PROCEDURE — 3008F BODY MASS INDEX DOCD: CPT | Performed by: FAMILY MEDICINE

## 2021-01-18 RX ORDER — AZELASTINE HYDROCHLORIDE 137 UG/1
SPRAY, METERED NASAL
COMMUNITY
Start: 2020-08-14

## 2021-01-18 RX ORDER — TRIAMCINOLONE ACETONIDE 55 UG/1
2 SPRAY, METERED NASAL DAILY
COMMUNITY

## 2021-01-18 NOTE — PROGRESS NOTES
Assessment/Plan: Alopecia the plan is to check a vitamin-D level and thyroid-stimulating hormone level  As well as assessing routine lab  The patient states that she has multiple allergies and is concerned about getting the COVID-19 vaccination discussed with the patient different vaccination options  One option would be waiting for Salli Kasal is 1 shot  Problem List Items Addressed This Visit     None      Visit Diagnoses     Alopecia    -  Primary    Relevant Orders    CBC and differential    Vitamin D deficiency        Relevant Orders    Vitamin D 25 hydroxy    Vitamin D 25 hydroxy    Screening for thyroid disorder        Relevant Orders    CBC and differential    TSH, 3rd generation    Screening for cholesterol level        Relevant Orders    CBC and differential    Lipid Panel with Direct LDL reflex           Diagnoses and all orders for this visit:    Alopecia  -     CBC and differential; Future    Vitamin D deficiency  -     Vitamin D 25 hydroxy; Future  -     Vitamin D 25 hydroxy; Future    Screening for thyroid disorder  -     CBC and differential; Future  -     TSH, 3rd generation; Future    Screening for cholesterol level  -     CBC and differential; Future  -     Lipid Panel with Direct LDL reflex; Future    Other orders  -     Cholecalciferol (Vitamin D3) 125 MCG (5000 UT) TABS; Take 5,000 Units by mouth daily  -     calcium-vitamin D 250-100 MG-UNIT per tablet; Take 1 tablet by mouth 2 (two) times a day  -     Triamcinolone Acetonide 55 MCG/ACT AERO; 2 sprays into each nostril daily (nasocort)  -     Azelastine HCl 137 MCG/SPRAY SOLN        No problem-specific Assessment & Plan notes found for this encounter  PHQ-9 Depression Screening    PHQ-9:   Frequency of the following problems over the past two weeks: Body mass index is 24 22 kg/m²  BMI Counseling: Body mass index is 24 22 kg/m²  The BMI     Subjective:      Patient ID: Crow Avila is a 62 y o  female  Patient presents with a chief complaint of alopecia 2 areas of alopecia above the ears bilaterally or pointed out to the patient by the hairdresser  Patient also complains of a lot of hair in the sink and in the shower  Patient is also here to discuss the COVID-19 vaccination      The following portions of the patient's history were reviewed and updated as appropriate:   She has a past medical history of Allergies and Asthma ,  does not have any pertinent problems on file  ,   has a past surgical history that includes TONSILLECTOMY; Knee Arthroplasty; Tonsillectomy; Sinus surgery; Knee arthroscopy (Right); and pr excision tumor soft tissue shoulder subq 3+cm (Right, 7/28/2020)  ,  family history includes Bone cancer in her maternal grandmother; Breast cancer (age of onset: 64) in her cousin; No Known Problems in her father, maternal aunt, maternal grandfather, mother, paternal grandfather, and paternal grandmother  ,   reports that she has never smoked  She has never used smokeless tobacco  She reports that she does not use drugs  No history on file for alcohol ,  is allergic to albumen, egg; clarithromycin; penicillin g; penicillins; quinolones; and sulfa antibiotics     Current Outpatient Medications   Medication Sig Dispense Refill    albuterol (ProAir HFA) 90 mcg/act inhaler Inhale      Azelastine HCl 137 MCG/SPRAY SOLN       calcium-vitamin D 250-100 MG-UNIT per tablet Take 1 tablet by mouth 2 (two) times a day      Cholecalciferol (Vitamin D3) 125 MCG (5000 UT) TABS Take 5,000 Units by mouth daily      clotrimazole 1 % external solution Apply topically 2 (two) times a day      estradiol (ESTRACE) 0 1 mg/g vaginal cream Insert 2 g into the vagina daily      Triamcinolone Acetonide 55 MCG/ACT AERO 2 sprays into each nostril daily (nasocort)      budesonide (RINOCORT AQUA) 32 MCG/ACT nasal spray 1 spray into each nostril daily       No current facility-administered medications for this visit  Review of Systems   Constitutional: Negative for chills and fever  HENT: Negative for ear pain and sore throat  Hair loss   Eyes: Negative for pain and visual disturbance  Respiratory: Negative for cough and shortness of breath  Cardiovascular: Negative for chest pain and palpitations  Gastrointestinal: Negative for abdominal pain and vomiting  Genitourinary: Negative for dysuria and hematuria  Musculoskeletal: Positive for arthralgias and myalgias  Negative for back pain  Skin: Negative for color change and rash  Neurological: Negative for seizures and syncope  All other systems reviewed and are negative  Objective:    /84   Pulse 64   Temp 98 5 °F (36 9 °C)   Resp 16   Ht 5' 3 75" (1 619 m)   Wt 63 5 kg (140 lb)   BMI 24 22 kg/m²   Body mass index is 24 22 kg/m²  Physical Exam  Constitutional:       Appearance: She is well-developed  HENT:      Head: Normocephalic  Comments: 1 cm x 2 cm area of hair loss above the right ear 1 cm x 1 cm area of her loss above the left ear  Eyes:      Pupils: Pupils are equal, round, and reactive to light  Neck:      Musculoskeletal: Normal range of motion  Cardiovascular:      Rate and Rhythm: Normal rate and regular rhythm  Heart sounds: Normal heart sounds  Pulmonary:      Effort: Pulmonary effort is normal       Breath sounds: Normal breath sounds  Abdominal:      General: Bowel sounds are normal       Palpations: Abdomen is soft  Tenderness: There is no abdominal tenderness  Skin:     General: Skin is warm  Neurological:      Mental Status: She is alert and oriented to person, place, and time

## 2021-01-19 ENCOUNTER — TELEPHONE (OUTPATIENT)
Dept: ADMINISTRATIVE | Facility: OTHER | Age: 61
End: 2021-01-19

## 2021-01-19 NOTE — TELEPHONE ENCOUNTER
Upon review of the In Basket request we have noted this is an outreach HIV request  Policy around HIV results is due to the sensitivity of the results, the patient must request and provide the requested records  Once received, the results can be sent via [email or web address] for the Centralized Scanning Team to update and complete the request      Any additional questions or concerns should be emailed to the Practice Liaisons via [email or web address] email, please do not reply via In Basket      Thank you  Malcolm Alvarez

## 2021-01-19 NOTE — TELEPHONE ENCOUNTER
----- Message from Anival Retana sent at 1/18/2021 11:18 AM EST -----  Regarding: Care Gaps Request  01/18/21 11:18 AM    Hello, our patient Marzena Grove has had HIV completed/performed  Please assist in updating the patient chart by making an External outreach to [facility] facility located in [place]  The date of service is most recent      Thank you,  Anival Retana  [facility]

## 2021-01-20 ENCOUNTER — LAB (OUTPATIENT)
Dept: LAB | Facility: HOSPITAL | Age: 61
End: 2021-01-20
Attending: FAMILY MEDICINE
Payer: COMMERCIAL

## 2021-01-20 DIAGNOSIS — L65.9 ALOPECIA: ICD-10-CM

## 2021-01-20 DIAGNOSIS — Z13.29 SCREENING FOR THYROID DISORDER: ICD-10-CM

## 2021-01-20 DIAGNOSIS — E55.9 VITAMIN D DEFICIENCY: ICD-10-CM

## 2021-01-20 DIAGNOSIS — Z13.220 SCREENING FOR CHOLESTEROL LEVEL: ICD-10-CM

## 2021-01-20 LAB
25(OH)D3 SERPL-MCNC: 35.4 NG/ML (ref 30–100)
BASOPHILS # BLD AUTO: 0.1 THOUSANDS/ΜL (ref 0–0.1)
BASOPHILS NFR BLD AUTO: 2 % (ref 0–1)
CHOLEST SERPL-MCNC: 225 MG/DL (ref 50–200)
EOSINOPHIL # BLD AUTO: 0.44 THOUSAND/ΜL (ref 0–0.61)
EOSINOPHIL NFR BLD AUTO: 7 % (ref 0–6)
ERYTHROCYTE [DISTWIDTH] IN BLOOD BY AUTOMATED COUNT: 11.9 % (ref 11.6–15.1)
HCT VFR BLD AUTO: 42.5 % (ref 34.8–46.1)
HDLC SERPL-MCNC: 79 MG/DL
HGB BLD-MCNC: 13.1 G/DL (ref 11.5–15.4)
IMM GRANULOCYTES # BLD AUTO: 0.01 THOUSAND/UL (ref 0–0.2)
IMM GRANULOCYTES NFR BLD AUTO: 0 % (ref 0–2)
LDLC SERPL CALC-MCNC: 124 MG/DL (ref 0–100)
LYMPHOCYTES # BLD AUTO: 1.71 THOUSANDS/ΜL (ref 0.6–4.47)
LYMPHOCYTES NFR BLD AUTO: 28 % (ref 14–44)
MCH RBC QN AUTO: 28.6 PG (ref 26.8–34.3)
MCHC RBC AUTO-ENTMCNC: 30.8 G/DL (ref 31.4–37.4)
MCV RBC AUTO: 93 FL (ref 82–98)
MONOCYTES # BLD AUTO: 0.43 THOUSAND/ΜL (ref 0.17–1.22)
MONOCYTES NFR BLD AUTO: 7 % (ref 4–12)
NEUTROPHILS # BLD AUTO: 3.38 THOUSANDS/ΜL (ref 1.85–7.62)
NEUTS SEG NFR BLD AUTO: 56 % (ref 43–75)
NRBC BLD AUTO-RTO: 0 /100 WBCS
PLATELET # BLD AUTO: 382 THOUSANDS/UL (ref 149–390)
PMV BLD AUTO: 9.6 FL (ref 8.9–12.7)
RBC # BLD AUTO: 4.58 MILLION/UL (ref 3.81–5.12)
TRIGL SERPL-MCNC: 108 MG/DL
TSH SERPL DL<=0.05 MIU/L-ACNC: 3.49 UIU/ML (ref 0.36–3.74)
WBC # BLD AUTO: 6.07 THOUSAND/UL (ref 4.31–10.16)

## 2021-01-20 PROCEDURE — 84443 ASSAY THYROID STIM HORMONE: CPT

## 2021-01-20 PROCEDURE — 85025 COMPLETE CBC W/AUTO DIFF WBC: CPT

## 2021-01-20 PROCEDURE — 82306 VITAMIN D 25 HYDROXY: CPT

## 2021-01-20 PROCEDURE — 80061 LIPID PANEL: CPT

## 2021-01-20 PROCEDURE — 36415 COLL VENOUS BLD VENIPUNCTURE: CPT

## 2021-02-11 ENCOUNTER — OFFICE VISIT (OUTPATIENT)
Dept: FAMILY MEDICINE CLINIC | Facility: CLINIC | Age: 61
End: 2021-02-11
Payer: COMMERCIAL

## 2021-02-11 VITALS
TEMPERATURE: 98.1 F | DIASTOLIC BLOOD PRESSURE: 84 MMHG | HEART RATE: 84 BPM | SYSTOLIC BLOOD PRESSURE: 140 MMHG | HEIGHT: 64 IN | RESPIRATION RATE: 20 BRPM | WEIGHT: 141 LBS | BODY MASS INDEX: 24.07 KG/M2

## 2021-02-11 DIAGNOSIS — M19.90 ARTHRITIS: Primary | ICD-10-CM

## 2021-02-11 DIAGNOSIS — L65.9 ALOPECIA: ICD-10-CM

## 2021-02-11 PROCEDURE — 99213 OFFICE O/P EST LOW 20 MIN: CPT | Performed by: FAMILY MEDICINE

## 2021-02-11 NOTE — PROGRESS NOTES
Assessment/Plan:Left hip pain severe at times the plan will be to x-ray the left hip  Polyarthralgias will check for autoimmune infectious causes of polyarthralgia    Alopecia will refer to dermatology  Patient wishes to contact the dermatologist herself to the appointment  I told the patient if she needs referral to contact our office    Problem List Items Addressed This Visit     None      Visit Diagnoses     Arthritis    -  Primary    Relevant Orders    TANIA Screen w/ Reflex to Titer/Pattern    C-reactive protein    Lyme Total Antibody Profile with reflex to WB    XR hip/pelv 2-3 vws left if performed           Diagnoses and all orders for this visit:    Arthritis  -     TANIA Screen w/ Reflex to Titer/Pattern; Future  -     C-reactive protein; Future  -     Lyme Total Antibody Profile with reflex to WB; Future  -     XR hip/pelv 2-3 vws left if performed; Future        No problem-specific Assessment & Plan notes found for this encounter  PHQ-9 Depression Screening    PHQ-9:   Frequency of the following problems over the past two weeks: Body mass index is 24 59 kg/m²  BMI Counseling: Body mass index is 24 59 kg/m²  The BMI     Subjective:      Patient ID: Nury Curry is a 62 y o  female  Patient presents with left hip pain especially when she 1st starts to walk, her when she rolls on it at night  Also complains of alopecia  States that she has no hair growth in her armpits around her legs and is noticing clumps of hair after she washes are  Patient also complains of polyarthralgia      The following portions of the patient's history were reviewed and updated as appropriate:   She has a past medical history of Allergies and Asthma ,  does not have any pertinent problems on file  ,   has a past surgical history that includes TONSILLECTOMY; Knee Arthroplasty;  Tonsillectomy; Sinus surgery; Knee arthroscopy (Right); and pr excision tumor soft tissue shoulder subq 3+cm (Right, 7/28/2020)  ,  family history includes Bone cancer in her maternal grandmother; Breast cancer (age of onset: 64) in her cousin; No Known Problems in her father, maternal aunt, maternal grandfather, mother, paternal grandfather, and paternal grandmother  ,   reports that she has never smoked  She has never used smokeless tobacco  She reports that she does not use drugs  No history on file for alcohol ,  is allergic to albumen, egg; clarithromycin; penicillin g; penicillins; quinolones; and sulfa antibiotics     Current Outpatient Medications   Medication Sig Dispense Refill    albuterol (ProAir HFA) 90 mcg/act inhaler Inhale      Azelastine HCl 137 MCG/SPRAY SOLN       budesonide (RINOCORT AQUA) 32 MCG/ACT nasal spray 1 spray into each nostril daily      calcium-vitamin D 250-100 MG-UNIT per tablet Take 1 tablet by mouth 2 (two) times a day      Cholecalciferol (Vitamin D3) 125 MCG (5000 UT) TABS Take 5,000 Units by mouth daily      clotrimazole 1 % external solution Apply topically 2 (two) times a day      estradiol (ESTRACE) 0 1 mg/g vaginal cream Insert 2 g into the vagina daily      Triamcinolone Acetonide 55 MCG/ACT AERO 2 sprays into each nostril daily (nasocort)       No current facility-administered medications for this visit  Review of Systems   Constitutional: Negative for chills and fever  HENT: Negative for ear pain and sore throat  Eyes: Negative for pain and visual disturbance  Respiratory: Negative for cough and shortness of breath  Cardiovascular: Negative for chest pain and palpitations  Gastrointestinal: Negative for abdominal pain and vomiting  Genitourinary: Negative for dysuria and hematuria  Musculoskeletal: Positive for arthralgias and myalgias  Negative for back pain  Severe left hip pain   Skin: Negative for color change and rash          Complains of clumps of hair loss when washing the hair states that she has no hair growth in her armpits or on her legs Neurological: Negative for seizures and syncope  All other systems reviewed and are negative  Objective:    /84   Pulse 84   Temp 98 1 °F (36 7 °C)   Resp 20   Ht 5' 3 5" (1 613 m)   Wt 64 kg (141 lb)   BMI 24 59 kg/m²   Body mass index is 24 59 kg/m²  Physical Exam  Constitutional:       Appearance: She is well-developed  HENT:      Head: Normocephalic  Eyes:      Pupils: Pupils are equal, round, and reactive to light  Neck:      Musculoskeletal: Normal range of motion  Cardiovascular:      Rate and Rhythm: Normal rate and regular rhythm  Heart sounds: Normal heart sounds  Pulmonary:      Effort: Pulmonary effort is normal       Breath sounds: Normal breath sounds  Abdominal:      General: Bowel sounds are normal       Palpations: Abdomen is soft  Tenderness: There is no abdominal tenderness  Musculoskeletal:      Comments: Pain on palpation of the lateral left hip osteoarthritic deformity mild of the hands   Skin:     General: Skin is warm  Neurological:      Mental Status: She is alert and oriented to person, place, and time

## 2021-02-17 ENCOUNTER — LAB (OUTPATIENT)
Dept: LAB | Facility: HOSPITAL | Age: 61
End: 2021-02-17
Attending: FAMILY MEDICINE
Payer: COMMERCIAL

## 2021-02-17 ENCOUNTER — HOSPITAL ENCOUNTER (OUTPATIENT)
Dept: RADIOLOGY | Facility: HOSPITAL | Age: 61
Discharge: HOME | End: 2021-02-17
Attending: FAMILY MEDICINE
Payer: COMMERCIAL

## 2021-02-17 DIAGNOSIS — M19.90 ARTHRITIS: ICD-10-CM

## 2021-02-17 LAB — CRP SERPL QL: <0.5 MG/L

## 2021-02-17 PROCEDURE — 86618 LYME DISEASE ANTIBODY: CPT

## 2021-02-17 PROCEDURE — 73502 X-RAY EXAM HIP UNI 2-3 VIEWS: CPT

## 2021-02-17 PROCEDURE — 36415 COLL VENOUS BLD VENIPUNCTURE: CPT

## 2021-02-17 PROCEDURE — 86140 C-REACTIVE PROTEIN: CPT

## 2021-02-17 PROCEDURE — 86038 ANTINUCLEAR ANTIBODIES: CPT

## 2021-02-18 LAB
B BURGDOR IGG+IGM SER-ACNC: 63
RYE IGE QN: NEGATIVE

## 2021-03-02 ENCOUNTER — TELEPHONE (OUTPATIENT)
Dept: FAMILY MEDICINE CLINIC | Facility: CLINIC | Age: 61
End: 2021-03-02

## 2021-03-02 NOTE — TELEPHONE ENCOUNTER
Patient states that she spoke to the billing dept and her insurance company regarding her bills she got for full price of both visits with insurance, they state its the coding?  The told her to check with the office

## 2021-03-04 NOTE — TELEPHONE ENCOUNTER
Spoke to Flor Lanier at central billing - states the office visits have been sent to coding review - that patient can disregard bill at this time - LM on  with same message to patient

## 2021-03-15 ENCOUNTER — OFFICE VISIT (OUTPATIENT)
Dept: FAMILY MEDICINE CLINIC | Facility: CLINIC | Age: 61
End: 2021-03-15
Payer: COMMERCIAL

## 2021-03-15 VITALS
BODY MASS INDEX: 23.39 KG/M2 | RESPIRATION RATE: 16 BRPM | DIASTOLIC BLOOD PRESSURE: 84 MMHG | SYSTOLIC BLOOD PRESSURE: 126 MMHG | HEART RATE: 68 BPM | TEMPERATURE: 98.2 F | HEIGHT: 64 IN | WEIGHT: 137 LBS

## 2021-03-15 DIAGNOSIS — S63.642A SKIER'S THUMB, LEFT, INITIAL ENCOUNTER: Primary | ICD-10-CM

## 2021-03-15 PROCEDURE — 1036F TOBACCO NON-USER: CPT | Performed by: FAMILY MEDICINE

## 2021-03-15 PROCEDURE — 99213 OFFICE O/P EST LOW 20 MIN: CPT | Performed by: FAMILY MEDICINE

## 2021-03-15 PROCEDURE — 3008F BODY MASS INDEX DOCD: CPT | Performed by: FAMILY MEDICINE

## 2021-03-15 NOTE — PROGRESS NOTES
Patient presents after a fall down stairs where she jammed her thumb left thumb  Vital signs are stable patient is afebrile    The heart is regular rate    The lungs are clear to auscultation and percussion  The thumb of the left hand is tender at the interphalangeal joint on the extensor surface consistent with skiers thumb  There is no ecchymosis at the area of injury  Slight edema at the area of injury The thumb was splinted with surgical tape the patient was advised to check for splint at the pharmacy  The patient was advised to try Motrin or Advil for pain along with ice  The patient was advised that the healing may take as little as 1 week to possibly 6 weeks if she has any difficulty we offered to refer to orthopedics

## 2021-04-01 DIAGNOSIS — J45.909 ASTHMA, UNSPECIFIED ASTHMA SEVERITY, UNSPECIFIED WHETHER COMPLICATED, UNSPECIFIED WHETHER PERSISTENT: Primary | ICD-10-CM

## 2021-04-01 RX ORDER — ALBUTEROL SULFATE 90 UG/1
2 AEROSOL, METERED RESPIRATORY (INHALATION) EVERY 4 HOURS PRN
Qty: 1 INHALER | Refills: 5 | Status: SHIPPED | OUTPATIENT
Start: 2021-04-01

## 2021-04-01 RX ORDER — METHYLPREDNISOLONE 4 MG/1
TABLET ORAL
Qty: 21 EACH | Refills: 0 | Status: SHIPPED | OUTPATIENT
Start: 2021-04-01 | End: 2021-09-25 | Stop reason: ALTCHOICE

## 2021-04-01 NOTE — TELEPHONE ENCOUNTER
Having a flare up of asthma x 4 days - requesting a refill on her rescue inhaler and also would like to have a medrol dose pack also - has been using her mini neb machine q 4 hours daily

## 2021-06-22 ENCOUNTER — VBI (OUTPATIENT)
Dept: ADMINISTRATIVE | Facility: OTHER | Age: 61
End: 2021-06-22

## 2021-09-20 ENCOUNTER — RA CDI HCC (OUTPATIENT)
Dept: OTHER | Facility: HOSPITAL | Age: 61
End: 2021-09-20

## 2021-09-20 NOTE — PROGRESS NOTES
[facility]  coding opportunities       Chart reviewed, no opportunity found: CHART REVIEWED, NO OPPORTUNITY FOUND                        Patients insurance company: [employer] (Medicare Advantage and Commercial)

## 2021-09-25 ENCOUNTER — OFFICE VISIT (OUTPATIENT)
Dept: URGENT CARE | Facility: CLINIC | Age: 61
End: 2021-09-25
Payer: COMMERCIAL

## 2021-09-25 VITALS — RESPIRATION RATE: 18 BRPM | TEMPERATURE: 97.4 F | HEART RATE: 84 BPM | OXYGEN SATURATION: 99 %

## 2021-09-25 DIAGNOSIS — Z20.822 EXPOSURE TO CONFIRMED CASE OF COVID-19: Primary | ICD-10-CM

## 2021-09-25 PROCEDURE — U0005 INFEC AGEN DETEC AMPLI PROBE: HCPCS | Performed by: NURSE PRACTITIONER

## 2021-09-25 PROCEDURE — U0003 INFECTIOUS AGENT DETECTION BY NUCLEIC ACID (DNA OR RNA); SEVERE ACUTE RESPIRATORY SYNDROME CORONAVIRUS 2 (SARS-COV-2) (CORONAVIRUS DISEASE [COVID-19]), AMPLIFIED PROBE TECHNIQUE, MAKING USE OF HIGH THROUGHPUT TECHNOLOGIES AS DESCRIBED BY CMS-2020-01-R: HCPCS | Performed by: NURSE PRACTITIONER

## 2021-09-25 PROCEDURE — 99213 OFFICE O/P EST LOW 20 MIN: CPT | Performed by: NURSE PRACTITIONER

## 2021-09-25 NOTE — PATIENT INSTRUCTIONS
COVID-19 (Coronavirus Disease 2019)   AMBULATORY CARE:   Coronavirus disease 2019 (COVID-19)  is the disease caused by a coronavirus first discovered in December 2019  Coronaviruses generally cause upper respiratory (nose, throat, and lung) infections, such as a cold  The new virus spreads quickly and easily  The virus can be spread starting 2 days before symptoms even begin  The virus has also changed into several new forms (called variants) since it was discovered  The variants may be more contagious (easily spread) than the original form  Some may also cause more severe illness than others  It is important to follow local, national, and worldwide measures to protect yourself and others from infection  Signs and symptoms of COVID-19  may not develop  Signs and symptoms that develop usually start about 5 days after infection but can take 2 to 14 days  You may feel like you have the flu or a bad cold  Some signs and symptoms go away in a few days  Others can last weeks, months, or possibly years  Information on COVID-19 is still being learned  Tell your healthcare provider if you think you were infected but develop signs or symptoms not listed below:  · A cough    · Shortness of breath or trouble breathing that may become severe    · A fever of at least 100 4°F, or 38°C (may be lower in adults 65 or older)    · Chills that might include shaking    · Muscle pain, body aches, or a headache    · A sore throat    · Suddenly not being able to taste or smell anything    · Feeling mentally and physically tired (fatigue)    · Congestion (stuffy head and nose), or a runny nose    · Diarrhea, nausea, or vomiting    If you think you or someone you know may be infected:  Do the following to protect others:  · If emergency care is needed,  tell the  about the possible infection, or call ahead and tell the emergency department  · Call a healthcare provider  for instructions if symptoms are mild   Anyone who may be infected should not  arrive without calling first  The provider will need to protect staff members and other patients  · The person who may be infected needs to wear a face covering  while getting medical care  This will help lower the risk of infecting others  Coverings are not used for anyone who is younger than 2 years, has breathing problems, or cannot remove it  The provider can give you instructions for anyone who cannot wear a covering  Call your local emergency number (911 in the [facility]) or an emergency department if:   · You have trouble breathing or shortness of breath at rest     · You have chest pain or pressure that lasts longer than 5 minutes  · You become confused or hard to wake  · Your lips or face are blue  · You have a fever of 104°F (40°C) or higher  Call your doctor if:   · You do not  have symptoms of COVID-19 but had close physical contact within 14 days with someone who tested positive  · You have questions or concerns about your condition or care  How COVID-19 is diagnosed: If you think you have COVID-19, call your healthcare provider  He or she will tell you what to do based on your symptoms and testing guidelines in your area  In general, the following may be used:  · A viral test  shows if you have a current infection  Samples are taken from your nose and throat, usually with swabs  You may need to wait several days to get the test results  Your healthcare provider will tell you how to get your results  You will need to quarantine (stay physically away from others) until you get your results  If results show you have COVID-19, you will need to continue until you are well  Your provider or other health official may give you more directions  You will also need to prevent another infection until it is known if you can get COVID-19 again  · An antibody test  shows if you had a past infection   Blood samples are used for this test  Antibodies are made by your immune system to attack the virus that causes COVID-19  Antibodies will form 1 to 3 weeks after you are infected  It is not known if antibodies prevent a second infection, or for how long a person might be protected  If you have antibodies, you will still need to be careful around others until more is known  · CT scans or x-rays  may be used to check for signs of pneumonia  The 2019 coronavirus causes a specific kind of pneumonia, usually in both lungs  The pictures may also be used to check for health problems in other parts of your body  Treatment  such as monoclonal antibodies and convalescent plasma have emergency use authorization (EUA)  This means they may be given only to patients who are hospitalized with severe signs and symptoms  The following may be used to manage your symptoms:  · Mild symptoms  may get better on their own  If you do not need to be treated in a hospital, you will be given instructions to use at home  Your condition will be closely monitored  You will need to watch for worsening symptoms and seek immediate care if needed  Talk to your healthcare provider about the following:    ? Decongestants  help reduce nasal congestion and help you breathe more easily  If you take decongestant pills, they may make you feel restless or cause problems with your sleep  Do not use decongestant sprays for more than a few days  ? Cough suppressants  help reduce coughing  Ask your healthcare provider which type of cough medicine is best for you  ? To soothe a sore throat,  gargle with warm salt water, or use throat lozenges or a throat spray  Drink more liquids to thin and loosen mucus and to prevent dehydration  ? NSAIDs or acetaminophen  can help lower a fever and relieve body aches or a headache  Follow directions  If not taken correctly, NSAIDs can cause kidney damage and acetaminophen can cause liver damage      · Severe or life-threatening symptoms  are treated in the hospital  You may need a combination of the following:    ? Medicines  may be given to lower or prevent inflammation or to fight the virus  You may also need blood thinners to prevent or treat blood clots  If you have a deep vein thrombosis (DVT) or pulmonary embolism (PE), you may need to keep using blood thinners for 3 months  ? Extra oxygen  may be given if you have respiratory failure  This means your lungs cannot get enough oxygen into your blood and out to your organs  Extra oxygen can help prevent organ failure  ? A ventilator  may be used to help you breathe  What you need to know about health problems the virus may cause:  Serious health problems may improve or continue for weeks, months, and possibly years  Health problems that continue may be called long COVID  Anyone can develop serious problems from this virus, but your risk is higher if you are 72 or older  A weak immune system, diabetes, or a heart or lung condition can also increase your risk  Your risk is also higher if you are a current or former cigarette smoker  COVID-19 can lead to any of the following:  · Serious lower respiratory conditions, such as pneumonia or acute respiratory distress syndrome (ARDS)    · Blood vessel damage, leading to blood clots    · Organ damage from a lack of oxygen or from blood clots    · Sleep problems    · Problems thinking clearly, remembering information, or concentrating    · Mood changes, depression, or anxiety    How the 2019 coronavirus spreads: The following are ways the virus is thought to spread, but more information may be coming:  · Droplets are the main way all coronaviruses spread  The virus travels in droplets that form when a person talks, coughs, or sneezes  The droplets can also float in the air for minutes or hours  Infection happens when you breathe in the droplets or get them in your eyes or nose  Close personal contact with an infected person increases your risk for infection   This means being within 6 feet (2 meters) of the person for at least 15 minutes over 24 hours  · Person-to-person contact can spread the virus  For example, a person with the virus on his or her hands can spread it by shaking hands with someone  · The virus can stay on objects and surfaces for a short time  You may become infected by touching the object or surface and then touching your eyes or mouth  · An infected animal may be able to infect a person who touches it  This may happen at live markets or on a farm  Help lower the risk for COVID-19:  The best way to prevent infection is to avoid anyone who is infected, but this can be hard to do  An infected person can spread the virus before signs or symptoms begin, or even if signs or symptoms never develop  The following can help lower the risk for infection:      · Wash your hands often throughout the day  Use soap and water  Rub your soapy hands together, lacing your fingers, for at least 20 seconds  Rinse with warm, running water  Dry your hands with a clean towel or paper towel  Use hand  that contains alcohol if soap and water are not available  Teach children how to wash their hands and use hand   · Cover sneezes and coughs  Turn your face away and cover your mouth and nose with a tissue  Throw the tissue away  Use the bend of your arm if a tissue is not available  Then wash your hands well with soap and water or use hand   Teach children how to cover a cough or sneeze  · Wear a face covering (mask) around anyone who does not live in your home  Use a cloth covering with at least 2 layers  You can also create layers by putting a cloth covering over a disposable non-medical mask  Cover your mouth and your nose  The covering should fit snugly against the bridge of your nose  Securely fasten it under your chin and on the sides of your face  Do not  wear a plastic face shield instead of a covering   Continue social distancing and washing your hands often  A face covering is not a substitute for social distancing safety measures  · Follow worldwide, national, and local social distancing guidelines  Keep at least 6 feet (2 meters) between you and others  Also keep this distance from anyone who comes to your home, such as someone making a delivery  Wear a face covering while you are around others  You will need to wear a covering in restaurants, stores, and other public buildings  You will also need a covering on mass transit, such as a bus, subway, or airplane  Remember to use a covering made from thick material or wear 2 coverings together  · Make a habit of not touching your face  If you get the virus on your hands, you can transfer it to your eyes, nose, or mouth and become infected  You can also transfer it to objects, surfaces, or people  Do not touch your eyes, nose, or mouth without washing your hands first     · Clean and disinfect high-touch surfaces and objects often  Use disinfecting wipes, or make a solution of 4 teaspoons of bleach in 1 quart (4 cups) of water  Clean and disinfect even if you think no one living in or coming to your home is infected with the virus  · Ask about vaccines you may need  Get a COVID-19 vaccine when it is available to you  The current vaccines are given as a shot in 1 or 2 doses  Get the influenza (flu) vaccine as soon as recommended each year, usually starting in September or October  Get the pneumonia vaccine if recommended  Follow social distancing guidelines:  National and local social distancing rules vary  Rules may change over time as restrictions are lifted  Restrictions may return if an outbreak happens where you live  It is important to know and follow all current social distancing rules in your area  The following are general guidelines:  · Stay home if you are sick or think you may have COVID-19    It is important to stay home if you are waiting for a testing appointment or for test results  Even if you do not have symptoms, you can pass the virus to others  · Limit trips out of your home  Have food, medicines, and other supplies delivered and left at your door or other area, if possible  Plan trips out of your home so you make the fewest stops possible to limit close personal contact  Keep track of places you go  This will help contact tracers notify others if you become infected  · Avoid close physical contact with anyone who does not live in your home  Do not shake hands with, hug, or kiss a person as a greeting  If you must use public transportation (such as a bus or subway), try to sit or stand away from others  Only allow necessary people into your home  Wear your face covering, and remind others to wear a face covering  Remind them to wash their hands when they arrive and before they leave  Do not  let someone into your home or go to someone's home just to visit  Even if you both do not feel sick, the virus can pass from one of you to the other  · Avoid in-person gatherings and crowds  Gatherings or crowds of 10 or more individuals can cause the virus to spread  Avoid places such as [place], beaches, sporting events, and tourist attractions  For events such as parties, holiday meals, [employer] services, and conferences, attend virtually (on a computer), if possible  · Ask your healthcare provider for other ways to have appointments  Some providers offer phone, video, or other types of appointments  You may also be able to get prescriptions for a few months of your medicines at a time  · Stay safe if you must go out to work  Keep physical distance between you and other workers as much as possible  Follow your employer's rules so everyone stays safe  If you have COVID-19 and are recovering at home,  healthcare providers will give you specific instructions to follow   The following are general guidelines to remind you how to keep others safe until you are well:  · Wash your hands often  Use soap and water as much as possible  Use hand  that contains alcohol if soap and water are not available  Dry your hands with a clean towel or paper towel  Do not share towels with anyone  If you use paper towels, throw them away in a lined trash can kept in your room or area  Use a covered trash can, if possible  · Do not go out of your home unless it is necessary  Ask someone who is not infected to go out for groceries or supplies, or have them delivered  Do not go to your healthcare provider's office without an appointment  · Only have close physical contact with a person giving direct care, or a baby or child you must care for  Family members and friends should not visit you  If possible, stay in a separate area or room of your home if you live with others  No one should go into the area or room except to give you care  You can visit with others by phone, video chat, e-mail, or similar systems  · Wear a face covering while others are near you  This can help prevent droplets from spreading the virus when you talk, sneeze, or cough  Put the covering on before anyone comes into your room or area  Remind the person to cover his or her nose and mouth before coming in to provide care for you  · Do not share items  Do not share dishes, towels, or other items with anyone  Items need to be washed after you use them  · Protect your baby  Some newborns have tested positive for the virus  It is not known if they became infected before or after birth  The highest risk is when a  has close contact with an infected person  If you are pregnant or breastfeeding, talk to your healthcare provider or obstetrician about any concerns you have  He or she will tell you when to bring your baby in for check-ups and vaccines  He or she will also tell you what to do if you think your baby was infected with the coronavirus   Wash your hands and put on a clean face covering before you breastfeed or care for your baby  · Do not handle live animals unless it is necessary  Some animals, including pets, have been infected with the new coronavirus  Ask someone who is not infected to take care of your pet until you are well  If you must care for a pet, wear a face covering  Wash your hands before and after you give care  Talk to your healthcare provider about how to keep a service animal safe, if needed  · Follow directions from your healthcare provider for being around others after you recover  It is not known if or for how long a recovered person can pass the virus to others  Your provider may give you instructions, such as continuing social distancing and wearing a face covering  He or she will tell you when it is okay to be around others again  This may be 10 to 20 days after symptoms started or you had a positive test  Most symptoms will also need to be gone  Your provider will give you more information  Follow up with your doctor as directed:  Write down your questions so you remember to ask them during your visits  For more information:   · Centers for Disease Control and Prevention  [address] , [address]  Phone: 7- 391 - 649-5995  Web Address: [employer]    © [address] [place] [address] 2021 Information is for End User's use only and may not be sold, redistributed or otherwise used for commercial purposes  All illustrations and images included in CareNotes® are the copyrighted property of [employer]  or [employer]  The above information is an  only  It is not intended as medical advice for individual conditions or treatments  Talk to your doctor, nurse or pharmacist before following any medical regimen to see if it is safe and effective for you

## 2021-09-25 NOTE — PROGRESS NOTES
[facility]        NAME: Lilliana Palm is a 62 y o  female  : 1960    MRN: [id number]  DATE: 2021  TIME: 9:29 AM      Assessment and Plan     Exposure to confirmed case of COVID-19 [Z20 822]  1  Exposure to confirmed case of COVID-19  Novel Coronavirus (Covid-19),PCR [facility] - Office Collection    CANCELED: Novel Coronavirus (Covid-19),PCR [facility] - Office Collection         Patient Instructions   Patient Instructions         COVID-19 (Coronavirus Disease 2019)   AMBULATORY CARE:   Coronavirus disease 2019 (COVID-19)  is the disease caused by a coronavirus first discovered in 2019  Coronaviruses generally cause upper respiratory (nose, throat, and lung) infections, such as a cold  The new virus spreads quickly and easily  The virus can be spread starting 2 days before symptoms even begin  The virus has also changed into several new forms (called variants) since it was discovered  The variants may be more contagious (easily spread) than the original form  Some may also cause more severe illness than others  It is important to follow local, national, and worldwide measures to protect yourself and others from infection  Signs and symptoms of COVID-19  may not develop  Signs and symptoms that develop usually start about 5 days after infection but can take 2 to 14 days  You may feel like you have the flu or a bad cold  Some signs and symptoms go away in a few days  Others can last weeks, months, or possibly years  Information on COVID-19 is still being learned   Tell your healthcare provider if you think you were infected but develop signs or symptoms not listed below:  · A cough    · Shortness of breath or trouble breathing that may become severe    · A fever of at least 100 4°F, or 38°C (may be lower in adults 65 or older)    · Chills that might include shaking    · Muscle pain, body aches, or a headache    · A sore throat    · Suddenly not being able to taste or smell anything    · Feeling mentally and physically tired (fatigue)    · Congestion (stuffy head and nose), or a runny nose    · Diarrhea, nausea, or vomiting    If you think you or someone you know may be infected:  Do the following to protect others:  · If emergency care is needed,  tell the  about the possible infection, or call ahead and tell the emergency department  · Call a healthcare provider  for instructions if symptoms are mild  Anyone who may be infected should not  arrive without calling first  The provider will need to protect staff members and other patients  · The person who may be infected needs to wear a face covering  while getting medical care  This will help lower the risk of infecting others  Coverings are not used for anyone who is younger than 2 years, has breathing problems, or cannot remove it  The provider can give you instructions for anyone who cannot wear a covering  Call your local emergency number (911 in the [address] [place] [address],[address]) or an emergency department if:   · You have trouble breathing or shortness of breath at rest     · You have chest pain or pressure that lasts longer than 5 minutes  · You become confused or hard to wake  · Your lips or face are blue  · You have a fever of 104°F (40°C) or higher  Call your doctor if:   · You do not  have symptoms of COVID-19 but had close physical contact within 14 days with someone who tested positive  · You have questions or concerns about your condition or care  How COVID-19 is diagnosed: If you think you have COVID-19, call your healthcare provider  He or she will tell you what to do based on your symptoms and testing guidelines in your area  In general, the following may be used:  · A viral test  shows if you have a current infection  Samples are taken from your nose and throat, usually with swabs  You may need to wait several days to get the test results  Your healthcare provider will tell you how to get your results   You will need to quarantine (stay physically away from others) until you get your results  If results show you have COVID-19, you will need to continue until you are well  Your provider or other health official may give you more directions  You will also need to prevent another infection until it is known if you can get COVID-19 again  · An antibody test  shows if you had a past infection  Blood samples are used for this test  Antibodies are made by your immune system to attack the virus that causes COVID-19  Antibodies will form 1 to 3 weeks after you are infected  It is not known if antibodies prevent a second infection, or for how long a person might be protected  If you have antibodies, you will still need to be careful around others until more is known  · CT scans or x-rays  may be used to check for signs of pneumonia  The 2019 coronavirus causes a specific kind of pneumonia, usually in both lungs  The pictures may also be used to check for health problems in other parts of your body  Treatment  such as monoclonal antibodies and convalescent plasma have emergency use authorization (EUA)  This means they may be given only to patients who are hospitalized with severe signs and symptoms  The following may be used to manage your symptoms:  · Mild symptoms  may get better on their own  If you do not need to be treated in a hospital, you will be given instructions to use at home  Your condition will be closely monitored  You will need to watch for worsening symptoms and seek immediate care if needed  Talk to your healthcare provider about the following:    ? Decongestants  help reduce nasal congestion and help you breathe more easily  If you take decongestant pills, they may make you feel restless or cause problems with your sleep  Do not use decongestant sprays for more than a few days  ? Cough suppressants  help reduce coughing   Ask your healthcare provider which type of cough medicine is best for you     ? To soothe a sore throat,  gargle with warm salt water, or use throat lozenges or a throat spray  Drink more liquids to thin and loosen mucus and to prevent dehydration  ? NSAIDs or acetaminophen  can help lower a fever and relieve body aches or a headache  Follow directions  If not taken correctly, NSAIDs can cause kidney damage and acetaminophen can cause liver damage  · Severe or life-threatening symptoms  are treated in the hospital  You may need a combination of the following:    ? Medicines  may be given to lower or prevent inflammation or to fight the virus  You may also need blood thinners to prevent or treat blood clots  If you have a deep vein thrombosis (DVT) or pulmonary embolism (PE), you may need to keep using blood thinners for 3 months  ? Extra oxygen  may be given if you have respiratory failure  This means your lungs cannot get enough oxygen into your blood and out to your organs  Extra oxygen can help prevent organ failure  ? A ventilator  may be used to help you breathe  What you need to know about health problems the virus may cause:  Serious health problems may improve or continue for weeks, months, and possibly years  Health problems that continue may be called long COVID  Anyone can develop serious problems from this virus, but your risk is higher if you are 72 or older  A weak immune system, diabetes, or a heart or lung condition can also increase your risk  Your risk is also higher if you are a current or former cigarette smoker  COVID-19 can lead to any of the following:  · Serious lower respiratory conditions, such as pneumonia or acute respiratory distress syndrome (ARDS)    · Blood vessel damage, leading to blood clots    · Organ damage from a lack of oxygen or from blood clots    · Sleep problems    · Problems thinking clearly, remembering information, or concentrating    · Mood changes, depression, or anxiety    How the 2019 coronavirus spreads:   The following are ways the virus is thought to spread, but more information may be coming:  · Droplets are the main way all coronaviruses spread  The virus travels in droplets that form when a person talks, coughs, or sneezes  The droplets can also float in the air for minutes or hours  Infection happens when you breathe in the droplets or get them in your eyes or nose  Close personal contact with an infected person increases your risk for infection  This means being within 6 feet (2 meters) of the person for at least 15 minutes over 24 hours  · Person-to-person contact can spread the virus  For example, a person with the virus on his or her hands can spread it by shaking hands with someone  · The virus can stay on objects and surfaces for a short time  You may become infected by touching the object or surface and then touching your eyes or mouth  · An infected animal may be able to infect a person who touches it  This may happen at live markets or on a farm  Help lower the risk for COVID-19:  The best way to prevent infection is to avoid anyone who is infected, but this can be hard to do  An infected person can spread the virus before signs or symptoms begin, or even if signs or symptoms never develop  The following can help lower the risk for infection:      · Wash your hands often throughout the day  Use soap and water  Rub your soapy hands together, lacing your fingers, for at least 20 seconds  Rinse with warm, running water  Dry your hands with a clean towel or paper towel  Use hand  that contains alcohol if soap and water are not available  Teach children how to wash their hands and use hand   · Cover sneezes and coughs  Turn your face away and cover your mouth and nose with a tissue  Throw the tissue away  Use the bend of your arm if a tissue is not available  Then wash your hands well with soap and water or use hand    Teach children how to cover a cough or sneeze  · Wear a face covering (mask) around anyone who does not live in your home  Use a cloth covering with at least 2 layers  You can also create layers by putting a cloth covering over a disposable non-medical mask  Cover your mouth and your nose  The covering should fit snugly against the bridge of your nose  Securely fasten it under your chin and on the sides of your face  Do not  wear a plastic face shield instead of a covering  Continue social distancing and washing your hands often  A face covering is not a substitute for social distancing safety measures  · Follow worldwide, national, and local social distancing guidelines  Keep at least 6 feet (2 meters) between you and others  Also keep this distance from anyone who comes to your home, such as someone making a delivery  Wear a face covering while you are around others  You will need to wear a covering in restaurants, stores, and other public buildings  You will also need a covering on mass transit, such as a bus, subway, or airplane  Remember to use a covering made from thick material or wear 2 coverings together  · Make a habit of not touching your face  If you get the virus on your hands, you can transfer it to your eyes, nose, or mouth and become infected  You can also transfer it to objects, surfaces, or people  Do not touch your eyes, nose, or mouth without washing your hands first     · Clean and disinfect high-touch surfaces and objects often  Use disinfecting wipes, or make a solution of 4 teaspoons of bleach in 1 quart (4 cups) of water  Clean and disinfect even if you think no one living in or coming to your home is infected with the virus  · Ask about vaccines you may need  Get a COVID-19 vaccine when it is available to you  The current vaccines are given as a shot in 1 or 2 doses  Get the influenza (flu) vaccine as soon as recommended each year, usually starting in September or October   Get the pneumonia vaccine if recommended  Follow social distancing guidelines:  National and local social distancing rules vary  Rules may change over time as restrictions are lifted  Restrictions may return if an outbreak happens where you live  It is important to know and follow all current social distancing rules in your area  The following are general guidelines:  · Stay home if you are sick or think you may have COVID-19  It is important to stay home if you are waiting for a testing appointment or for test results  Even if you do not have symptoms, you can pass the virus to others  · Limit trips out of your home  Have food, medicines, and other supplies delivered and left at your door or other area, if possible  Plan trips out of your home so you make the fewest stops possible to limit close personal contact  Keep track of places you go  This will help contact tracers notify others if you become infected  · Avoid close physical contact with anyone who does not live in your home  Do not shake hands with, hug, or kiss a person as a greeting  If you must use public transportation (such as a bus or subway), try to sit or stand away from others  Only allow necessary people into your home  Wear your face covering, and remind others to wear a face covering  Remind them to wash their hands when they arrive and before they leave  Do not  let someone into your home or go to someone's home just to visit  Even if you both do not feel sick, the virus can pass from one of you to the other  · Avoid in-person gatherings and crowds  Gatherings or crowds of 10 or more individuals can cause the virus to spread  Avoid places such as [place], beaches, sporting events, and tourist attractions  For events such as parties, holiday meals, [employer] services, and conferences, attend virtually (on a computer), if possible  · Ask your healthcare provider for other ways to have appointments    Some providers offer phone, video, or other types of appointments  You may also be able to get prescriptions for a few months of your medicines at a time  · Stay safe if you must go out to work  Keep physical distance between you and other workers as much as possible  Follow your employer's rules so everyone stays safe  If you have COVID-19 and are recovering at home,  healthcare providers will give you specific instructions to follow  The following are general guidelines to remind you how to keep others safe until you are well:  · Wash your hands often  Use soap and water as much as possible  Use hand  that contains alcohol if soap and water are not available  Dry your hands with a clean towel or paper towel  Do not share towels with anyone  If you use paper towels, throw them away in a lined trash can kept in your room or area  Use a covered trash can, if possible  · Do not go out of your home unless it is necessary  Ask someone who is not infected to go out for groceries or supplies, or have them delivered  Do not go to your healthcare provider's office without an appointment  · Only have close physical contact with a person giving direct care, or a baby or child you must care for  Family members and friends should not visit you  If possible, stay in a separate area or room of your home if you live with others  No one should go into the area or room except to give you care  You can visit with others by phone, video chat, e-mail, or similar systems  · Wear a face covering while others are near you  This can help prevent droplets from spreading the virus when you talk, sneeze, or cough  Put the covering on before anyone comes into your room or area  Remind the person to cover his or her nose and mouth before coming in to provide care for you  · Do not share items  Do not share dishes, towels, or other items with anyone  Items need to be washed after you use them  · Protect your baby    Some newborns have tested positive for the virus  It is not known if they became infected before or after birth  The highest risk is when a  has close contact with an infected person  If you are pregnant or breastfeeding, talk to your healthcare provider or obstetrician about any concerns you have  He or she will tell you when to bring your baby in for check-ups and vaccines  He or she will also tell you what to do if you think your baby was infected with the coronavirus  Wash your hands and put on a clean face covering before you breastfeed or care for your baby  · Do not handle live animals unless it is necessary  Some animals, including pets, have been infected with the new coronavirus  Ask someone who is not infected to take care of your pet until you are well  If you must care for a pet, wear a face covering  Wash your hands before and after you give care  Talk to your healthcare provider about how to keep a service animal safe, if needed  · Follow directions from your healthcare provider for being around others after you recover  It is not known if or for how long a recovered person can pass the virus to others  Your provider may give you instructions, such as continuing social distancing and wearing a face covering  He or she will tell you when it is okay to be around others again  This may be 10 to 20 days after symptoms started or you had a positive test  Most symptoms will also need to be gone  Your provider will give you more information  Follow up with your doctor as directed:  Write down your questions so you remember to ask them during your visits  For more information:   · Centers for Disease Control and Prevention  [address] , [address]  Phone: 6- 502 - 702-7109  Web Address: [employer]    © [address] [place] [address]  Information is for End User's use only and may not be sold, redistributed or otherwise used for commercial purposes   All illustrations and images included in CareNotes® are the copyrighted property of A D A M , Inc  or [address] [facility]  The above information is an  only  It is not intended as medical advice for individual conditions or treatments  Talk to your doctor, nurse or pharmacist before following any medical regimen to see if it is safe and effective for you  Follow up with PCP in 3-5 days  Proceed to  ER if symptoms worsen  Chief Complaint     Chief Complaint   Patient presents with    covid exposure         History of Present Illness     Patient was exposed to + covid-19 on Tuesday  No symptoms  Did contact her PCP earlier in the week; testing options presented  She tried to call her PCP back on Friday but was not able to get through  Since 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, so patient presents here today  Review of Systems     Review of Systems   All other systems reviewed and are negative          Current Medications       Current Outpatient Medications:     albuterol (ProAir HFA) 90 mcg/act inhaler, Inhale 2 puffs every 4 (four) hours as needed for wheezing (asthma), Disp: 1 Inhaler, Rfl: 5    Azelastine HCl 137 MCG/SPRAY SOLN, , Disp: , Rfl:     budesonide (RINOCORT AQUA) 32 MCG/ACT nasal spray, 1 spray into each nostril daily, Disp: , Rfl:     calcium-vitamin D 250-100 MG-UNIT per tablet, Take 1 tablet by mouth 2 (two) times a day, Disp: , Rfl:     Cholecalciferol (Vitamin D3) 125 MCG (5000 UT) TABS, Take 5,000 Units by mouth daily, Disp: , Rfl:     clotrimazole 1 % external solution, Apply topically 2 (two) times a day, Disp: , Rfl:     estradiol (ESTRACE) 0 1 mg/g vaginal cream, Insert 2 g into the vagina daily, Disp: , Rfl:     Triamcinolone Acetonide 55 MCG/ACT AERO, 2 sprays into each nostril daily (nasocort), Disp: , Rfl:     Current Allergies     Allergies as of 09/25/2021 - Reviewed 09/25/2021   Allergen Reaction Noted    Albumen, egg - food allergy Facial Swelling 12/17/2019    Clarithromycin Hives 06/23/2020    Penicillin g  07/07/2020    Penicillins Hives 06/12/2020    Quinolones Rash 12/17/2019    Sulfa antibiotics Rash 07/07/2020              The following portions of the patient's history were reviewed and updated as appropriate: allergies, current medications, past family history, past medical history, past social history, past surgical history and problem list      Past Medical History:   Diagnosis Date    Allergies     Asthma        Past Surgical History:   Procedure Laterality Date    KNEE ARTHROPLASTY      KNEE ARTHROSCOPY Right     MD EXCISION TUMOR SOFT TISSUE SHOULDER SUBQ 3+CM Right 7/28/2020    Procedure: EXCISION BIOPSY TISSUE LESION/MASS right posterior shoulder;  Surgeon: Marina Massey DO;  Location: [facility];  Service: General    SINUS SURGERY      TONSILLECTOMY      TONSILLECTOMY         Family History   Problem Relation Age of Onset    No Known Problems Mother     No Known Problems Father     Bone cancer Maternal Grandmother     No Known Problems Maternal Grandfather     No Known Problems Paternal Grandmother     No Known Problems Paternal Grandfather     No Known Problems Maternal Aunt     Breast cancer Cousin 64        Maternal         Medications have been verified  Objective     Pulse 84   Temp (!) 97 4 °F (36 3 °C)   Resp 18   SpO2 99%   No LMP recorded  Patient is postmenopausal          Physical Exam     Physical Exam  Vitals and nursing note reviewed  Constitutional:       General: She is not in acute distress  Appearance: Normal appearance  She is well-developed and well-groomed  She is not ill-appearing, toxic-appearing or diaphoretic  HENT:      Head: Normocephalic and atraumatic  Eyes:      Pupils: Pupils are equal, round, and reactive to light  Pulmonary:      Effort: Pulmonary effort is normal  No respiratory distress  Abdominal:      General: There is no distension  Palpations: Abdomen is soft     Musculoskeletal: General: Normal range of motion  Cervical back: Normal range of motion and neck supple  Skin:     General: Skin is warm and dry  Capillary Refill: Capillary refill takes less than 2 seconds  Neurological:      General: No focal deficit present  Mental Status: She is alert and oriented to person, place, and time  Psychiatric:         Mood and Affect: Mood normal          Behavior: Behavior normal  Behavior is cooperative  Thought Content:  Thought content normal          Judgment: Judgment normal

## 2021-09-26 LAB — SARS-COV-2 RNA RESP QL NAA+PROBE: NEGATIVE

## 2021-09-27 ENCOUNTER — TELEPHONE (OUTPATIENT)
Dept: URGENT CARE | Facility: CLINIC | Age: 61
End: 2021-09-27

## 2021-09-27 NOTE — TELEPHONE ENCOUNTER
Called and reviewed + covid-19 results with patient  She states that she tried to activate her [email or web address] account, but since she and her  share an email and since he was already registered, she was unable to  She tried calling the [phone] phone #, but they are only open Mon-Fri  She will try to call today  She is not sure if her work will take her verbal result or if she needs it in writing  She will call back here if she needs a letter either printed here for  or placed in the [email or web address] micki if she can get it activated

## 2021-09-29 ENCOUNTER — OFFICE VISIT (OUTPATIENT)
Dept: FAMILY MEDICINE CLINIC | Facility: CLINIC | Age: 61
End: 2021-09-29
Payer: COMMERCIAL

## 2021-09-29 VITALS
SYSTOLIC BLOOD PRESSURE: 124 MMHG | HEIGHT: 64 IN | HEART RATE: 68 BPM | DIASTOLIC BLOOD PRESSURE: 68 MMHG | RESPIRATION RATE: 20 BRPM | BODY MASS INDEX: 23.05 KG/M2 | WEIGHT: 135 LBS | TEMPERATURE: 98.6 F

## 2021-09-29 DIAGNOSIS — E55.9 VITAMIN D DEFICIENCY: ICD-10-CM

## 2021-09-29 DIAGNOSIS — Z11.4 SCREENING FOR HIV (HUMAN IMMUNODEFICIENCY VIRUS): ICD-10-CM

## 2021-09-29 DIAGNOSIS — J45.909 ASTHMA, UNSPECIFIED ASTHMA SEVERITY, UNSPECIFIED WHETHER COMPLICATED, UNSPECIFIED WHETHER PERSISTENT: ICD-10-CM

## 2021-09-29 DIAGNOSIS — Z00.00 ANNUAL PHYSICAL EXAM: Primary | ICD-10-CM

## 2021-09-29 DIAGNOSIS — L30.9 DERMATITIS: ICD-10-CM

## 2021-09-29 DIAGNOSIS — Z11.59 NEED FOR HEPATITIS C SCREENING TEST: ICD-10-CM

## 2021-09-29 PROCEDURE — 3008F BODY MASS INDEX DOCD: CPT | Performed by: FAMILY MEDICINE

## 2021-09-29 PROCEDURE — 3725F SCREEN DEPRESSION PERFORMED: CPT | Performed by: FAMILY MEDICINE

## 2021-09-29 PROCEDURE — 99396 PREV VISIT EST AGE 40-64: CPT | Performed by: FAMILY MEDICINE

## 2021-09-29 PROCEDURE — 1036F TOBACCO NON-USER: CPT | Performed by: FAMILY MEDICINE

## 2021-09-29 RX ORDER — BETAMETHASONE DIPROPIONATE 0.5 MG/G
CREAM TOPICAL 2 TIMES DAILY
Qty: 30 G | Refills: 0 | Status: SHIPPED | OUTPATIENT
Start: 2021-09-29

## 2021-09-29 NOTE — PATIENT INSTRUCTIONS

## 2021-09-29 NOTE — PROGRESS NOTES
ADULT ANNUAL [id number] [address] PRIMARY CARE    NAME: Jarret Burns  AGE: 62 y o  SEX: female  : 1960     DATE: 2021     Assessment and Plan: asthma is using the rescue inhaler infrequently    Dermatitis unspecified diprolene cream is effective     Problem List Items Addressed This Visit     None          Immunizations and preventive care screenings were discussed with patient today  Appropriate education was printed on patient's after visit summary  Counseling:  Alcohol/drug use: discussed moderation in alcohol intake, the recommendations for healthy alcohol use, and avoidance of illicit drug use  Dental Health: discussed importance of regular tooth brushing, flossing, and dental visits  Injury prevention: discussed safety/seat belts, safety helmets, smoke detectors, carbon dioxide detectors, and smoking near bedding or upholstery  · Exercise: the importance of regular exercise/physical activity was discussed  Recommend exercise 3-5 times per week for at least 30 minutes  Depression Screening and Follow-up Plan:   Patient was screened for depression during today's encounter  They screened negative with a PHQ-2 score of 0  No follow-ups on file  Chief Complaint:     Chief Complaint   Patient presents with    Annual Exam     Employee wellness program for [employer]      History of Present Illness:     Adult Annual Physical   Patient here for a comprehensive physical exam  The patient reports no problems  Diet and Physical Activity  · Diet/Nutrition: well balanced diet  · Exercise: walking, strength training exercises, 5-7 times a week on average and 30-60 minutes on average        Depression Screening  PHQ-9 Depression Screening    PHQ-9:   Frequency of the following problems over the past two weeks:      Little interest or pleasure in doing things: 0 - not at all  Feeling down, depressed, or hopeless: 0 - not at all  PHQ-2 Score: 0       General Health  · Sleep: sleeps poorly and gets 4-6 hours of sleep on average  · Hearing: normal - bilateral   · Vision: no vision problems  · Dental: regular dental visits, brushes teeth three times daily and flosses teeth occasionally  /GYN Health  · Patient is: postmenopausal  ·   · Contraceptive method: Menopausal      Review of Systems:     Review of Systems   Constitutional: Negative for chills and fever  HENT: Negative for ear pain and sore throat  Eyes: Negative for pain and visual disturbance  Respiratory: Negative for cough and shortness of breath  Cardiovascular: Negative for chest pain and palpitations  Gastrointestinal: Negative for abdominal pain and vomiting  Genitourinary: Negative for dysuria and hematuria  Musculoskeletal: Negative for arthralgias and back pain  Skin: Negative for color change and rash  Neurological: Negative for seizures and syncope  All other systems reviewed and are negative       Past Medical History:     Past Medical History:   Diagnosis Date    Allergies     Asthma       Past Surgical History:     Past Surgical History:   Procedure Laterality Date    KNEE ARTHROPLASTY      KNEE ARTHROSCOPY Right     ID EXCISION TUMOR SOFT TISSUE SHOULDER SUBQ 3+CM Right 7/28/2020    Procedure: EXCISION BIOPSY TISSUE LESION/MASS right posterior shoulder;  Surgeon: Roge Martin DO;  Location: [facility];  Service: General    SINUS SURGERY      TONSILLECTOMY      TONSILLECTOMY        Social History:     Social History     Socioeconomic History    Marital status: /Civil Union     Spouse name: None    Number of children: None    Years of education: None    Highest education level: None   Occupational History    None   Tobacco Use    Smoking status: Never Smoker    Smokeless tobacco: Never Used   Vaping Use    Vaping Use: Never used   Substance and Sexual Activity    Alcohol use: Yes     Comment: socially/weekend     Drug use: Never    Sexual activity: None   Other Topics Concern    None   Social History Narrative    None     Social Determinants of Health     Financial Resource Strain:     Difficulty of Paying Living Expenses:    Food Insecurity:     Worried About Running Out of Food in the Last Year:     [address] in the Last Year:    Transportation Needs:     Lack of Transportation (Medical):      Lack of Transportation (Non-Medical):    Physical Activity:     Days of Exercise per Week:     Minutes of Exercise per Session:    Stress:     Feeling of Stress :    Social Connections:     Frequency of Communication with Friends and Family:     Frequency of Social Gatherings with Friends and Family:     Attends [employer] Services:     Active Member of Clubs or Organizations:     Attends Club or Organization Meetings:     Marital Status:    Intimate Partner Violence:     Fear of Current or Ex-Partner:     Emotionally Abused:     Physically Abused:     Sexually Abused:       Family History:     Family History   Problem Relation Age of Onset    No Known Problems Mother     No Known Problems Father     Bone cancer Maternal Grandmother     No Known Problems Maternal Grandfather     No Known Problems Paternal Grandmother     No Known Problems Paternal Grandfather     No Known Problems Maternal Aunt     Breast cancer Cousin 64        Maternal      Current Medications:     Current Outpatient Medications   Medication Sig Dispense Refill    albuterol (ProAir HFA) 90 mcg/act inhaler Inhale 2 puffs every 4 (four) hours as needed for wheezing (asthma) 1 Inhaler 5    Azelastine HCl 137 MCG/SPRAY SOLN       budesonide (RINOCORT AQUA) 32 MCG/ACT nasal spray 1 spray into each nostril daily      calcium-vitamin D 250-100 MG-UNIT per tablet Take 1 tablet by mouth 2 (two) times a day      Cholecalciferol (Vitamin D3) 125 MCG (5000 UT) TABS Take 5,000 Units by mouth daily      clotrimazole 1 % external solution Apply topically 2 (two) times a day      estradiol (ESTRACE) 0 1 mg/g vaginal cream Insert 2 g into the vagina daily      Triamcinolone Acetonide 55 MCG/ACT AERO 2 sprays into each nostril daily (nasocort)       No current facility-administered medications for this visit  Allergies: Allergies   Allergen Reactions    Albumen, Egg - Food Allergy Facial Swelling    Clarithromycin Hives    Penicillin G     Penicillins Hives    Quinolones Rash    Sulfa Antibiotics Rash      Physical Exam:     /68   Pulse 68   Temp 98 6 °F (37 °C)   Resp 20   Ht 5' 3 5" (1 613 m)   Wt 61 2 kg (135 lb)   BMI 23 54 kg/m²     Physical Exam  Vitals and nursing note reviewed  Constitutional:       General: She is not in acute distress  Appearance: She is well-developed  HENT:      Head: Normocephalic and atraumatic  Eyes:      Conjunctiva/sclera: Conjunctivae normal    Cardiovascular:      Rate and Rhythm: Normal rate and regular rhythm  Heart sounds: No murmur heard  Pulmonary:      Effort: Pulmonary effort is normal  No respiratory distress  Breath sounds: Normal breath sounds  Abdominal:      Palpations: Abdomen is soft  Tenderness: There is no abdominal tenderness  Musculoskeletal:      Cervical back: Neck supple  Skin:     General: Skin is warm and dry  Neurological:      Mental Status: She is alert            Tam Carreon DO  [facility] PRIMARY CARE

## 2021-10-21 ENCOUNTER — APPOINTMENT (OUTPATIENT)
Dept: LAB | Facility: HOSPITAL | Age: 61
End: 2021-10-21
Attending: FAMILY MEDICINE
Payer: COMMERCIAL

## 2021-10-21 DIAGNOSIS — Z00.00 ANNUAL PHYSICAL EXAM: ICD-10-CM

## 2021-10-21 DIAGNOSIS — Z11.4 SCREENING FOR HIV (HUMAN IMMUNODEFICIENCY VIRUS): ICD-10-CM

## 2021-10-21 DIAGNOSIS — E55.9 VITAMIN D DEFICIENCY: ICD-10-CM

## 2021-10-21 DIAGNOSIS — Z11.59 NEED FOR HEPATITIS C SCREENING TEST: ICD-10-CM

## 2021-10-21 LAB
25(OH)D3 SERPL-MCNC: 27.4 NG/ML (ref 30–100)
ALBUMIN SERPL BCP-MCNC: 4.1 G/DL (ref 3.5–5)
ALP SERPL-CCNC: 40 U/L (ref 46–116)
ALT SERPL W P-5'-P-CCNC: 23 U/L (ref 12–78)
ANION GAP SERPL CALCULATED.3IONS-SCNC: 8 MMOL/L (ref 4–13)
AST SERPL W P-5'-P-CCNC: 21 U/L (ref 5–45)
BILIRUB SERPL-MCNC: 0.59 MG/DL (ref 0.2–1)
BUN SERPL-MCNC: 16 MG/DL (ref 5–25)
CALCIUM SERPL-MCNC: 9.3 MG/DL (ref 8.3–10.1)
CHLORIDE SERPL-SCNC: 104 MMOL/L (ref 100–108)
CHOLEST SERPL-MCNC: 226 MG/DL (ref 50–200)
CO2 SERPL-SCNC: 30 MMOL/L (ref 21–32)
CREAT SERPL-MCNC: 0.75 MG/DL (ref 0.6–1.3)
GFR SERPL CREATININE-BSD FRML MDRD: 87 ML/MIN/1.73SQ M
GLUCOSE P FAST SERPL-MCNC: 103 MG/DL (ref 65–99)
HDLC SERPL-MCNC: 80 MG/DL
LDLC SERPL CALC-MCNC: 124 MG/DL (ref 0–100)
NONHDLC SERPL-MCNC: 146 MG/DL
POTASSIUM SERPL-SCNC: 4.1 MMOL/L (ref 3.5–5.3)
PROT SERPL-MCNC: 7.7 G/DL (ref 6.4–8.2)
SODIUM SERPL-SCNC: 142 MMOL/L (ref 136–145)
TRIGL SERPL-MCNC: 110 MG/DL
TSH SERPL DL<=0.05 MIU/L-ACNC: 2.56 UIU/ML (ref 0.36–3.74)

## 2021-10-21 PROCEDURE — 84443 ASSAY THYROID STIM HORMONE: CPT

## 2021-10-21 PROCEDURE — 86706 HEP B SURFACE ANTIBODY: CPT

## 2021-10-21 PROCEDURE — 87340 HEPATITIS B SURFACE AG IA: CPT

## 2021-10-21 PROCEDURE — 36415 COLL VENOUS BLD VENIPUNCTURE: CPT

## 2021-10-21 PROCEDURE — 87389 HIV-1 AG W/HIV-1&-2 AB AG IA: CPT

## 2021-10-21 PROCEDURE — 80061 LIPID PANEL: CPT

## 2021-10-21 PROCEDURE — 80053 COMPREHEN METABOLIC PANEL: CPT

## 2021-10-21 PROCEDURE — 86803 HEPATITIS C AB TEST: CPT

## 2021-10-21 PROCEDURE — 86704 HEP B CORE ANTIBODY TOTAL: CPT

## 2021-10-21 PROCEDURE — 82306 VITAMIN D 25 HYDROXY: CPT

## 2021-10-22 DIAGNOSIS — E55.9 VITAMIN D DEFICIENCY: Primary | ICD-10-CM

## 2021-10-22 LAB
HBV CORE AB SER QL: NORMAL
HBV SURFACE AB SER-ACNC: <3.1 MIU/ML
HBV SURFACE AG SER QL: NORMAL
HCV AB SER QL: NORMAL

## 2021-10-22 RX ORDER — ERGOCALCIFEROL 1.25 MG/1
50000 CAPSULE ORAL WEEKLY
Qty: 12 CAPSULE | Refills: 0 | Status: SHIPPED | OUTPATIENT
Start: 2021-10-22 | End: 2022-07-05 | Stop reason: SDUPTHER

## 2021-10-23 LAB — HIV 1+2 AB+HIV1 P24 AG SERPL QL IA: NORMAL

## 2021-10-27 ENCOUNTER — VBI (OUTPATIENT)
Dept: ADMINISTRATIVE | Facility: OTHER | Age: 61
End: 2021-10-27

## 2021-11-01 ENCOUNTER — TELEPHONE (OUTPATIENT)
Dept: FAMILY MEDICINE CLINIC | Facility: CLINIC | Age: 61
End: 2021-11-01

## 2022-05-09 DIAGNOSIS — E55.9 VITAMIN D DEFICIENCY: Primary | ICD-10-CM

## 2022-06-24 ENCOUNTER — APPOINTMENT (OUTPATIENT)
Dept: LAB | Facility: HOSPITAL | Age: 62
End: 2022-06-24
Attending: FAMILY MEDICINE
Payer: COMMERCIAL

## 2022-06-24 DIAGNOSIS — E55.9 VITAMIN D DEFICIENCY: ICD-10-CM

## 2022-06-24 LAB — 25(OH)D3 SERPL-MCNC: 21.5 NG/ML (ref 30–100)

## 2022-06-24 PROCEDURE — 82306 VITAMIN D 25 HYDROXY: CPT

## 2022-06-24 PROCEDURE — 36415 COLL VENOUS BLD VENIPUNCTURE: CPT

## 2022-07-05 DIAGNOSIS — E55.9 VITAMIN D DEFICIENCY: ICD-10-CM

## 2022-07-05 RX ORDER — ERGOCALCIFEROL 1.25 MG/1
50000 CAPSULE ORAL WEEKLY
Qty: 12 CAPSULE | Refills: 0 | Status: SHIPPED | OUTPATIENT
Start: 2022-07-05

## 2022-09-28 ENCOUNTER — OFFICE VISIT (OUTPATIENT)
Dept: FAMILY MEDICINE CLINIC | Facility: CLINIC | Age: 62
End: 2022-09-28
Payer: COMMERCIAL

## 2022-09-28 VITALS
TEMPERATURE: 98.2 F | BODY MASS INDEX: 22.2 KG/M2 | HEART RATE: 76 BPM | DIASTOLIC BLOOD PRESSURE: 68 MMHG | SYSTOLIC BLOOD PRESSURE: 120 MMHG | HEIGHT: 64 IN | RESPIRATION RATE: 16 BRPM | WEIGHT: 130 LBS

## 2022-09-28 DIAGNOSIS — J45.909 ASTHMA, UNSPECIFIED ASTHMA SEVERITY, UNSPECIFIED WHETHER COMPLICATED, UNSPECIFIED WHETHER PERSISTENT: ICD-10-CM

## 2022-09-28 DIAGNOSIS — Z13.220 SCREENING FOR CHOLESTEROL LEVEL: ICD-10-CM

## 2022-09-28 DIAGNOSIS — L65.9 ALOPECIA: ICD-10-CM

## 2022-09-28 DIAGNOSIS — E55.9 VITAMIN D DEFICIENCY: Primary | ICD-10-CM

## 2022-09-28 DIAGNOSIS — Z13.29 SCREENING FOR THYROID DISORDER: ICD-10-CM

## 2022-09-28 PROCEDURE — 3725F SCREEN DEPRESSION PERFORMED: CPT | Performed by: FAMILY MEDICINE

## 2022-09-28 PROCEDURE — 99213 OFFICE O/P EST LOW 20 MIN: CPT | Performed by: FAMILY MEDICINE

## 2022-09-28 NOTE — PROGRESS NOTES
Assessment/Plan:  Vitamin-D deficiency on may get dose correction laboratory is pending    Alopecia unknown etiology    Mild intermittent asthma uses ProAir infrequently    Problem List Items Addressed This Visit    None     Visit Diagnoses     Vitamin D deficiency    -  Primary    Relevant Orders    Vitamin D 25 hydroxy    Asthma, unspecified asthma severity, unspecified whether complicated, unspecified whether persistent        Alopecia        Relevant Orders    CBC and differential    Comprehensive metabolic panel    Screening for cholesterol level        Relevant Orders    Lipid Panel with Direct LDL reflex    Screening for thyroid disorder        Relevant Orders    TSH, 3rd generation           Diagnoses and all orders for this visit:    Vitamin D deficiency  -     Vitamin D 25 hydroxy; Future    Asthma, unspecified asthma severity, unspecified whether complicated, unspecified whether persistent    Alopecia  -     CBC and differential; Future  -     Comprehensive metabolic panel; Future    Screening for cholesterol level  -     Lipid Panel with Direct LDL reflex; Future    Screening for thyroid disorder  -     TSH, 3rd generation; Future      No problem-specific Assessment & Plan notes found for this encounter  PHQ-2/9 Depression Screening    Little interest or pleasure in doing things: 0 - not at all  Feeling down, depressed, or hopeless: 0 - not at all  PHQ-2 Score: 0  PHQ-2 Interpretation: Negative depression screen          Body mass index is 22 67 kg/m²  BMI Counseling: Body mass index is 22 67 kg/m²  The BMI   Subjective:      Patient ID: Brittany Abarca is a 65 y o  female  Patient presents for an employee wellness exam      The following portions of the patient's history were reviewed and updated as appropriate:   She has a past medical history of Allergies and Asthma ,  does not have any pertinent problems on file  ,   has a past surgical history that includes TONSILLECTOMY; Knee Arthroplasty; Tonsillectomy; Sinus surgery; Knee arthroscopy (Right); and pr excision tumor soft tissue shoulder subq 3+cm (Right, 7/28/2020)  ,  family history includes Bone cancer in her maternal grandmother; Breast cancer (age of onset: 64) in her cousin; No Known Problems in her father, maternal aunt, maternal grandfather, mother, paternal grandfather, and paternal grandmother  ,   reports that she has never smoked  She has never used smokeless tobacco  She reports current alcohol use  She reports that she does not use drugs  ,  is allergic to albumen, egg - food allergy; clarithromycin; penicillin g; penicillins; quinolones; and sulfa antibiotics     Current Outpatient Medications   Medication Sig Dispense Refill    albuterol (ProAir HFA) 90 mcg/act inhaler Inhale 2 puffs every 4 (four) hours as needed for wheezing (asthma) 1 Inhaler 5    Azelastine HCl 137 MCG/SPRAY SOLN       betamethasone dipropionate (DIPROSONE) 0 05 % cream Apply topically 2 (two) times a day 30 g 0    budesonide (RINOCORT AQUA) 32 MCG/ACT nasal spray 1 spray into each nostril daily      calcium-vitamin D 250-100 MG-UNIT per tablet Take 1 tablet by mouth 2 (two) times a day      clotrimazole 1 % external solution Apply topically 2 (two) times a day      ergocalciferol (VITAMIN D2) 50,000 units Take 1 capsule (50,000 Units total) by mouth once a week 12 capsule 0    estradiol (ESTRACE) 0 1 mg/g vaginal cream Insert 2 g into the vagina daily      Triamcinolone Acetonide 55 MCG/ACT AERO 2 sprays into each nostril daily (nasocort)       No current facility-administered medications for this visit  Review of Systems   Constitutional: Negative for chills and fever  HENT: Negative for ear pain and sore throat  Eyes: Negative for pain and visual disturbance  Respiratory: Negative for cough and shortness of breath  Cardiovascular: Negative for chest pain and palpitations     Gastrointestinal: Negative for abdominal pain and vomiting  Genitourinary: Negative for dysuria and hematuria  Musculoskeletal: Negative for arthralgias and back pain  Skin: Negative for color change and rash  Neurological: Negative for seizures and syncope  All other systems reviewed and are negative  Objective:    /68   Pulse 76   Temp 98 2 °F (36 8 °C)   Resp 16   Ht 5' 3 5" (1 613 m)   Wt 59 kg (130 lb)   BMI 22 67 kg/m²   Body mass index is 22 67 kg/m²  Physical Exam  Constitutional:       Appearance: She is well-developed  HENT:      Head: Normocephalic  Eyes:      Pupils: Pupils are equal, round, and reactive to light  Cardiovascular:      Rate and Rhythm: Normal rate and regular rhythm  Heart sounds: Normal heart sounds  Pulmonary:      Effort: Pulmonary effort is normal       Breath sounds: Normal breath sounds  Abdominal:      General: Bowel sounds are normal       Palpations: Abdomen is soft  Tenderness: There is no abdominal tenderness  Musculoskeletal:      Cervical back: Normal range of motion  Skin:     General: Skin is warm  Neurological:      Mental Status: She is alert and oriented to person, place, and time

## 2022-10-03 ENCOUNTER — RA CDI HCC (OUTPATIENT)
Dept: OTHER | Facility: HOSPITAL | Age: 62
End: 2022-10-03

## 2022-10-03 NOTE — PROGRESS NOTES
[facility]  coding opportunities       Chart reviewed, no opportunity found: CHART REVIEWED, NO OPPORTUNITY FOUND        Patients Insurance        Commercial Insurance: [employer]

## 2023-02-13 ENCOUNTER — TELEPHONE (OUTPATIENT)
Dept: FAMILY MEDICINE CLINIC | Facility: CLINIC | Age: 63
End: 2023-02-13

## 2023-02-15 NOTE — TELEPHONE ENCOUNTER
Patient called; had PE 09/28/2022, not due until September  Patient will call mid June to schedule for September

## 2023-05-17 ENCOUNTER — TELEPHONE (OUTPATIENT)
Dept: PSYCHIATRY | Facility: CLINIC | Age: 63
End: 2023-05-17

## 2023-07-06 ENCOUNTER — TELEPHONE (OUTPATIENT)
Dept: ADMINISTRATIVE | Facility: OTHER | Age: 63
End: 2023-07-06

## 2023-07-06 NOTE — TELEPHONE ENCOUNTER
----- Message from Varun Hebert sent at 2023 10:09 AM EDT -----  Regarding: Care Gaps Request  23 10:09 AM    Hello, our patient Robel  has had Pap Smear (HPV) aka Cervical Cancer Screening completed/performed. Please assist in updating the patient chart by pulling the Care Everywhere (CE) document. The date of service is 2023.      Thank you,  Varun Hebert  [facility]

## 2023-07-06 NOTE — TELEPHONE ENCOUNTER
Upon review of the In Basket request we were able to locate, review, and update the patient chart as requested for Pap Smear (HPV) aka Cervical Cancer Screening. Any additional questions or concerns should be emailed to the Practice Liaisons via the appropriate education email address, please do not reply via In Basket.     Thank you  Earnestine Callejas

## 2023-07-14 ENCOUNTER — HOSPITAL ENCOUNTER (OUTPATIENT)
Dept: MAMMOGRAPHY | Facility: HOSPITAL | Age: 63
Discharge: HOME | End: 2023-07-14
Payer: COMMERCIAL

## 2023-07-14 VITALS — BODY MASS INDEX: 22.21 KG/M2 | WEIGHT: 130.07 LBS | HEIGHT: 64 IN

## 2023-07-14 DIAGNOSIS — Z12.31 ENCOUNTER FOR SCREENING MAMMOGRAM FOR MALIGNANT NEOPLASM OF BREAST: ICD-10-CM

## 2023-07-14 PROCEDURE — 77067 SCR MAMMO BI INCL CAD: CPT

## 2023-07-14 PROCEDURE — 77063 BREAST TOMOSYNTHESIS BI: CPT

## 2023-08-03 ENCOUNTER — RA CDI HCC (OUTPATIENT)
Dept: OTHER | Facility: HOSPITAL | Age: 63
End: 2023-08-03

## 2023-08-03 NOTE — PROGRESS NOTES
[address] [facility] coding opportunities          Chart Reviewed number of suggestions sent to Provider: 1  [id number]     Patients Insurance        Commercial Insurance: [employer]

## 2023-08-10 ENCOUNTER — APPOINTMENT (OUTPATIENT)
Dept: LAB | Facility: CLINIC | Age: 63
End: 2023-08-10
Payer: COMMERCIAL

## 2023-08-10 DIAGNOSIS — L65.9 ALOPECIA: ICD-10-CM

## 2023-08-10 DIAGNOSIS — E55.9 VITAMIN D DEFICIENCY: ICD-10-CM

## 2023-08-10 DIAGNOSIS — Z13.29 SCREENING FOR THYROID DISORDER: ICD-10-CM

## 2023-08-10 DIAGNOSIS — Z13.220 SCREENING FOR CHOLESTEROL LEVEL: ICD-10-CM

## 2023-08-10 LAB
25(OH)D3 SERPL-MCNC: 25.7 NG/ML (ref 30–100)
ALBUMIN SERPL BCP-MCNC: 3.9 G/DL (ref 3.5–5)
ALP SERPL-CCNC: 34 U/L (ref 46–116)
ALT SERPL W P-5'-P-CCNC: 25 U/L (ref 12–78)
ANION GAP SERPL CALCULATED.3IONS-SCNC: 3 MMOL/L
AST SERPL W P-5'-P-CCNC: 17 U/L (ref 5–45)
BASOPHILS # BLD AUTO: 0.09 THOUSANDS/ÂΜL (ref 0–0.1)
BASOPHILS NFR BLD AUTO: 1 % (ref 0–1)
BILIRUB SERPL-MCNC: 0.48 MG/DL (ref 0.2–1)
BUN SERPL-MCNC: 13 MG/DL (ref 5–25)
CALCIUM SERPL-MCNC: 9.2 MG/DL (ref 8.3–10.1)
CHLORIDE SERPL-SCNC: 110 MMOL/L (ref 96–108)
CHOLEST SERPL-MCNC: 220 MG/DL
CO2 SERPL-SCNC: 28 MMOL/L (ref 21–32)
CREAT SERPL-MCNC: 0.82 MG/DL (ref 0.6–1.3)
EOSINOPHIL # BLD AUTO: 0.13 THOUSAND/ÂΜL (ref 0–0.61)
EOSINOPHIL NFR BLD AUTO: 2 % (ref 0–6)
ERYTHROCYTE [DISTWIDTH] IN BLOOD BY AUTOMATED COUNT: 12.3 % (ref 11.6–15.1)
GFR SERPL CREATININE-BSD FRML MDRD: 76 ML/MIN/1.73SQ M
GLUCOSE P FAST SERPL-MCNC: 88 MG/DL (ref 65–99)
HCT VFR BLD AUTO: 42.9 % (ref 34.8–46.1)
HDLC SERPL-MCNC: 79 MG/DL
HGB BLD-MCNC: 13.3 G/DL (ref 11.5–15.4)
IMM GRANULOCYTES # BLD AUTO: 0.04 THOUSAND/UL (ref 0–0.2)
IMM GRANULOCYTES NFR BLD AUTO: 1 % (ref 0–2)
LDLC SERPL CALC-MCNC: 126 MG/DL (ref 0–100)
LYMPHOCYTES # BLD AUTO: 2.38 THOUSANDS/ÂΜL (ref 0.6–4.47)
LYMPHOCYTES NFR BLD AUTO: 33 % (ref 14–44)
MCH RBC QN AUTO: 29.8 PG (ref 26.8–34.3)
MCHC RBC AUTO-ENTMCNC: 31 G/DL (ref 31.4–37.4)
MCV RBC AUTO: 96 FL (ref 82–98)
MONOCYTES # BLD AUTO: 0.49 THOUSAND/ÂΜL (ref 0.17–1.22)
MONOCYTES NFR BLD AUTO: 7 % (ref 4–12)
NEUTROPHILS # BLD AUTO: 4.16 THOUSANDS/ÂΜL (ref 1.85–7.62)
NEUTS SEG NFR BLD AUTO: 56 % (ref 43–75)
NRBC BLD AUTO-RTO: 0 /100 WBCS
PLATELET # BLD AUTO: 451 THOUSANDS/UL (ref 149–390)
PMV BLD AUTO: 10.3 FL (ref 8.9–12.7)
POTASSIUM SERPL-SCNC: 3.9 MMOL/L (ref 3.5–5.3)
PROT SERPL-MCNC: 7 G/DL (ref 6.4–8.4)
RBC # BLD AUTO: 4.46 MILLION/UL (ref 3.81–5.12)
SODIUM SERPL-SCNC: 141 MMOL/L (ref 135–147)
TRIGL SERPL-MCNC: 73 MG/DL
TSH SERPL DL<=0.05 MIU/L-ACNC: 3.37 UIU/ML (ref 0.45–4.5)
WBC # BLD AUTO: 7.29 THOUSAND/UL (ref 4.31–10.16)

## 2023-08-10 PROCEDURE — 82306 VITAMIN D 25 HYDROXY: CPT

## 2023-08-10 PROCEDURE — 85025 COMPLETE CBC W/AUTO DIFF WBC: CPT

## 2023-08-10 PROCEDURE — 80053 COMPREHEN METABOLIC PANEL: CPT

## 2023-08-10 PROCEDURE — 36415 COLL VENOUS BLD VENIPUNCTURE: CPT

## 2023-08-10 PROCEDURE — 80061 LIPID PANEL: CPT

## 2023-08-10 PROCEDURE — 84443 ASSAY THYROID STIM HORMONE: CPT

## 2023-08-17 ENCOUNTER — OFFICE VISIT (OUTPATIENT)
Dept: FAMILY MEDICINE CLINIC | Facility: CLINIC | Age: 63
End: 2023-08-17
Payer: COMMERCIAL

## 2023-08-17 VITALS
BODY MASS INDEX: 22.02 KG/M2 | WEIGHT: 129 LBS | SYSTOLIC BLOOD PRESSURE: 126 MMHG | DIASTOLIC BLOOD PRESSURE: 78 MMHG | TEMPERATURE: 98.2 F | RESPIRATION RATE: 20 BRPM | HEIGHT: 64 IN | HEART RATE: 84 BPM

## 2023-08-17 DIAGNOSIS — E55.9 VITAMIN D DEFICIENCY: ICD-10-CM

## 2023-08-17 DIAGNOSIS — E78.49 OTHER HYPERLIPIDEMIA: ICD-10-CM

## 2023-08-17 DIAGNOSIS — Z00.00 ANNUAL PHYSICAL EXAM: Primary | ICD-10-CM

## 2023-08-17 PROCEDURE — 99213 OFFICE O/P EST LOW 20 MIN: CPT | Performed by: FAMILY MEDICINE

## 2023-08-17 PROCEDURE — 99396 PREV VISIT EST AGE 40-64: CPT | Performed by: FAMILY MEDICINE

## 2023-08-17 RX ORDER — ERGOCALCIFEROL 1.25 MG/1
50000 CAPSULE ORAL WEEKLY
Qty: 12 CAPSULE | Refills: 0 | Status: SHIPPED | OUTPATIENT
Start: 2023-08-17

## 2023-08-17 NOTE — PROGRESS NOTES
ADULT ANNUAL [address] [facility] PRIMARY CARE    NAME: Nichol Melton  AGE: 59 y.o. SEX: female  : 1960     DATE: 2023     Assessment and Plan: Vitamin D deficiency we will supplement vitamin D 50,000 units weekly for 3 months then 1000 units daily. The patient is taking a calcium supplement of 400 mg daily    Hyperlipidemia with total cholesterol of 220 and LDL of 126 with an HDL of 79 patient will work on diet and we will supplement vitamin D her cholesterol to HDL cholesterol ratio as are below average risk     Problem List Items Addressed This Visit    None  Visit Diagnoses     Annual physical exam    -  Primary    Vitamin D deficiency        Relevant Medications    ergocalciferol (VITAMIN D2) 50,000 units    Other Relevant Orders    Vitamin D 25 hydroxy    Other hyperlipidemia        Relevant Orders    CBC and differential    Lipid Panel with Direct LDL reflex    Comprehensive metabolic panel          Immunizations and preventive care screenings were discussed with patient today. Appropriate education was printed on patient's after visit summary. Counseling:  Alcohol/drug use: discussed moderation in alcohol intake, the recommendations for healthy alcohol use, and avoidance of illicit drug use. Dental Health: discussed importance of regular tooth brushing, flossing, and dental visits. Injury prevention: discussed safety/seat belts, safety helmets, smoke detectors, carbon dioxide detectors, and smoking near bedding or upholstery. Sexual health: discussed sexually transmitted diseases, partner selection, use of condoms, avoidance of unintended pregnancy, and contraceptive alternatives. Exercise: the importance of regular exercise/physical activity was discussed. Recommend exercise 3-5 times per week for at least 30 minutes. No follow-ups on file.      Chief Complaint:     Chief Complaint   Patient presents with   • Annual Exam Work PE      History of Present Illness:     Adult Annual Physical   Patient here for a comprehensive physical exam. The patient reports no problems. Diet and Physical Activity  Diet/Nutrition: well balanced diet. Exercise: walking, strength training exercises, 5-7 times a week on average and 30-60 minutes on average. Depression Screening  PHQ-2/9 Depression Screening         General Health  Sleep: sleeps well and gets 4-6 hours of sleep on average. Hearing: normal - bilateral.  Vision: no vision problems. Dental: regular dental visits, brushes teeth twice daily and does not brush teeth regularly. /GYN Health  Patient is: postmenopausal  Last menstrual period: yrs  Contraceptive method: Menopause     Review of Systems:     Review of Systems   Constitutional: Negative for chills and fever. HENT: Negative for ear pain and sore throat. Eyes: Negative for pain and visual disturbance. Respiratory: Negative for cough and shortness of breath. Cardiovascular: Negative for chest pain and palpitations. Gastrointestinal: Negative for abdominal pain and vomiting. Genitourinary: Negative for dysuria and hematuria. Musculoskeletal: Negative for arthralgias and back pain. Skin: Negative for color change and rash. Neurological: Negative for seizures and syncope. All other systems reviewed and are negative.      Past Medical History:     Past Medical History:   Diagnosis Date   • Allergies    • Asthma       Past Surgical History:     Past Surgical History:   Procedure Laterality Date   • KNEE ARTHROPLASTY     • KNEE ARTHROSCOPY Right    • DC EXCISION TUMOR SOFT TISSUE SHOULDER SUBQ 3 CM/> Right 7/28/2020    Procedure: EXCISION BIOPSY TISSUE LESION/MASS right posterior shoulder;  Surgeon: Bart Aldrich DO;  Location: [facility] MAIN OR;  Service: General   • SINUS SURGERY     • TONSILLECTOMY     • TONSILLECTOMY        Social History:     Social History     Socioeconomic History   • Marital status: /Civil Union     Spouse name: None   • Number of children: None   • Years of education: None   • Highest education level: None   Occupational History   • None   Tobacco Use   • Smoking status: Never   • Smokeless tobacco: Never   Vaping Use   • Vaping Use: Never used   Substance and Sexual Activity   • Alcohol use: Yes     Comment: socially/weekend    • Drug use: Never   • Sexual activity: None   Other Topics Concern   • None   Social History Narrative   • None     Social Determinants of Health     Financial Resource Strain: Not on file   Food Insecurity: Not on file   Transportation Needs: Not on file   Physical Activity: Not on file   Stress: Not on file   Social Connections: Not on file   Intimate Partner Violence: Not on file   Housing Stability: Not on file      Family History:     Family History   Problem Relation Age of Onset   • No Known Problems Mother    • No Known Problems Father    • Bone cancer Maternal Grandmother    • No Known Problems Maternal Grandfather    • No Known Problems Paternal Grandmother    • No Known Problems Paternal Grandfather    • No Known Problems Maternal Aunt    • Breast cancer Cousin 64        Maternal      Current Medications:     Current Outpatient Medications   Medication Sig Dispense Refill   • albuterol (ProAir HFA) 90 mcg/act inhaler Inhale 2 puffs every 4 (four) hours as needed for wheezing (asthma) 1 Inhaler 5   • calcium-vitamin D 250-100 MG-UNIT per tablet Take 1 tablet by mouth 2 (two) times a day     • ergocalciferol (VITAMIN D2) 50,000 units Take 1 capsule (50,000 Units total) by mouth once a week 12 capsule 0   • estradiol (ESTRACE) 0.1 mg/g vaginal cream Insert 2 g into the vagina daily     • Triamcinolone Acetonide 55 MCG/ACT AERO 2 sprays into each nostril daily (nasocort)       No current facility-administered medications for this visit. Allergies:      Allergies   Allergen Reactions   • Albumen, Egg - Food Allergy Facial Swelling   • Clarithromycin Hives   • Penicillin G    • Penicillins Hives   • Quinolones Rash   • Sulfa Antibiotics Rash      Physical Exam:     /78   Pulse 84   Temp 98.2 °F (36.8 °C)   Resp 20   Ht 5' 3.5" (1.613 m)   Wt 58.5 kg (129 lb)   BMI 22.49 kg/m²     Physical Exam  Vitals and nursing note reviewed. Constitutional:       General: She is not in acute distress. Appearance: Normal appearance. She is well-developed and normal weight. HENT:      Head: Normocephalic and atraumatic. Right Ear: Tympanic membrane, ear canal and external ear normal.      Left Ear: Tympanic membrane, ear canal and external ear normal.      Mouth/Throat:      Mouth: Mucous membranes are moist.      Pharynx: Oropharynx is clear. Eyes:      Extraocular Movements: Extraocular movements intact. Conjunctiva/sclera: Conjunctivae normal.      Pupils: Pupils are equal, round, and reactive to light. Cardiovascular:      Rate and Rhythm: Normal rate and regular rhythm. Heart sounds: No murmur heard. Pulmonary:      Effort: Pulmonary effort is normal. No respiratory distress. Breath sounds: Normal breath sounds. Abdominal:      General: Abdomen is flat. Bowel sounds are normal.      Palpations: Abdomen is soft. Tenderness: There is no abdominal tenderness. Musculoskeletal:         General: No swelling. Cervical back: Normal range of motion and neck supple. Skin:     General: Skin is warm and dry. Capillary Refill: Capillary refill takes less than 2 seconds. Neurological:      General: No focal deficit present. Mental Status: She is alert and oriented to person, place, and time. Psychiatric:         Mood and Affect: Mood normal.         Behavior: Behavior normal.         Thought Content:  Thought content normal.         Judgment: Judgment normal.          Skyler Lezama DO  [facility] PRIMARY CARE

## 2023-11-18 ENCOUNTER — OFFICE VISIT (OUTPATIENT)
Dept: URGENT CARE | Facility: MEDICAL CENTER | Age: 63
End: 2023-11-18
Payer: COMMERCIAL

## 2023-11-18 VITALS
RESPIRATION RATE: 20 BRPM | OXYGEN SATURATION: 99 % | TEMPERATURE: 97.6 F | SYSTOLIC BLOOD PRESSURE: 128 MMHG | HEART RATE: 74 BPM | DIASTOLIC BLOOD PRESSURE: 74 MMHG

## 2023-11-18 DIAGNOSIS — S00.462A TICK BITE OF LEFT EAR, INITIAL ENCOUNTER: Primary | ICD-10-CM
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
## 2023-11-18 PROCEDURE — 99213 OFFICE O/P EST LOW 20 MIN: CPT | Performed by: STUDENT IN AN ORGANIZED HEALTH CARE EDUCATION/TRAINING PROGRAM

## 2023-11-18 RX ORDER — DOXYCYCLINE HYCLATE 100 MG/1
200 CAPSULE ORAL ONCE
Qty: 2 CAPSULE | Refills: 0 | Status: SHIPPED | OUTPATIENT
Start: 2023-11-18 | End: 2023-11-18

## 2023-11-18 NOTE — PATIENT INSTRUCTIONS
Tick Bite   WHAT YOU NEED TO KNOW:   Ticks need to be removed quickly. Most tick bites are not dangerous, but ticks can pass disease or infection when they bite. Diseases include Lyme disease, babesiosis, tularemia, and [place] Spotted Fever. Signs and symptoms may develop weeks or even months after a bite. Some may happen right away, such as redness, pain, itching, and swelling near the bite area. Watch for a fever, rash, body aches, or breathing problems. These may be symptoms of a serious disease that needs to be treated quickly. DISCHARGE INSTRUCTIONS:   Return to the emergency department if:   You have trouble walking or moving your legs. You have joint pain, muscle pain, or muscle weakness within 1 month of a tick bite. You have a fever, chills, headache, or rash. Call your doctor if:   You cannot remove the tick. The tick's head is stuck in your skin. You have questions or concerns about your condition or care. Medicines:   Medicines  help decrease pain, redness, itching, and swelling. You may also need medicine to prevent or fight a bacterial infection. These medicines may be given as a cream, lotion, or pill. Take your medicine as directed. Contact your healthcare provider if you think your medicine is not helping or if you have side effects. Tell your provider if you are allergic to any medicine. Keep a list of the medicines, vitamins, and herbs you take. Include the amounts, and when and why you take them. Bring the list or the pill bottles to follow-up visits. Carry your medicine list with you in case of an emergency. How to remove a tick:  Remove the tick as soon as possible to help prevent disease or infection. You are less likely to get sick from a tick bite if you remove the tick within 24 hours. Do not use petroleum jelly, nail polish, rubbing alcohol, or heat. These do not work and may be dangerous. Do the following to remove a tick:  First, try a soapy cotton ball. Soak a cotton ball in liquid soap. Cover the tick with the cotton ball for 30 seconds. The tick may come off with the cotton ball when you pull it away. Use tweezers if the soapy cotton ball does not work. Grasp the tick as close to your skin as possible. Pull the tick straight up and out. Do not touch the tick with your bare hands. Do not twist or jerk the tick suddenly, because this may break off the tick's head or mouth parts. Do not leave any part of the tick in your skin. Do not crush or squeeze the tick since its body may be infected with germs. Flush the tick down the toilet. After the tick is removed, clean the area of the bite with rubbing alcohol. Then wash your hands with soap and water. Apply ice  on your bite for 15 to 20 minutes every hour or as directed. Use an ice pack, or put crushed ice in a plastic bag. Cover it with a towel before you apply it to your skin. Ice helps prevent tissue damage and decreases swelling and pain. Prevent a tick bite:  Ticks live in areas covered by brush and grass. They may even be found in your lawn if you live in certain areas. Outdoor pets can carry ticks inside the house. Ticks can grab onto you or your clothes when you walk by grass or brush. If you go into areas that contain many trees, tall grasses, and underbrush, do the following:  Wear light colored pants and a long-sleeved shirt. Tuck your pants into your socks or boots. Tuck in your shirt. Wear sleeves that fit close to the skin at your wrists and neck. This will help prevent ticks from crawling through gaps in your clothing and onto your skin. Wear a hat in areas with trees. Apply insect repellant on your skin. The insect repellant should contain DEET. Do not put insect repellant on skin that is cut, scratched, or irritated. Always use soap and water to wash the insect repellant off as soon as possible once you are indoors.  Do not apply insect repellant on your child's face or hands.    Spray insect repellant onto your clothes. Use permethrin spray. This spray kills ticks that crawl on your clothing. Be sure to spray the tops of your boots, bottom of pant legs, and sleeve cuffs. As soon as possible, wash and dry clothing in hot water and high heat. Check your clothing, hair, and skin for ticks. Shower within 2 hours of coming indoors. Carefully check the hairline, armpits, neck, and waist. Check your pets and children for ticks. Remove ticks from pets the same way as you remove them from people. Decrease the risk for ticks in your yard. Ticks like to live in shady, moist areas. [place] your lawn regularly to keep the grass short. Trim the grass around birdbaths and fences. Cut branches that are overgrown and take them out of the yard. Clear out leaf piles. [place] firewood in a dry, afshin area. Follow up with your doctor as directed:  Write down your questions so you remember to ask them during your visits. © Copyright [facility] 2023 Information is for End User's use only and may not be sold, redistributed or otherwise used for commercial purposes. The above information is an  only. It is not intended as medical advice for individual conditions or treatments. Talk to your doctor, nurse or pharmacist before following any medical regimen to see if it is safe and effective for you.

## 2023-11-18 NOTE — PROGRESS NOTES
[facility]        NAME: Aleksandra Manzano is a 59 y.o. female  : 1960    MRN: [id number]    Assessment and Plan   Tick bite of left ear, initial encounter [I56.439B, W57. XXXA]  1. Tick bite of left ear, initial encounter  doxycycline hyclate (VIBRAMYCIN) 100 mg capsule        No symptoms indicating lyme disease or other tick associated disease. Tick was engorged and removed within 72 hours so will tx prophylactically with doxycyline 200 mg. Provided pt warning signs of lyme and other tick associated diseases and to seek immediate medical attention if this were to happen. Patient Instructions       Follow up with PCP in 3-5 days. Proceed to  ER if symptoms worsen. Chief Complaint     Chief Complaint   Patient presents with    tick bite to back of left ear         History of Present Illness       HPI    P/w tick bite to back of left ear which was noted day before yesterday and removed by pt's . Can not identify tick but does report it was engorged. Hx of lyme disease 5-6 years ago. Denies fever, chills, nausea, vomiting, chest pain, dyspnea, arthralgias, myalgias, numbness, weakness, bull's eye rash. Review of Systems   Review of Systems   Constitutional:  Negative for chills and fever. HENT:  Negative for ear pain and sore throat. Eyes:  Negative for pain and visual disturbance. Respiratory:  Negative for cough, chest tightness and shortness of breath. Cardiovascular:  Negative for chest pain and palpitations. Gastrointestinal:  Negative for abdominal pain, constipation, diarrhea, nausea and vomiting. Genitourinary:  Negative for dysuria, hematuria and menstrual problem. Musculoskeletal:  Negative for arthralgias and back pain. Skin:  Positive for wound. Negative for color change and rash. Neurological:  Negative for seizures and syncope. Psychiatric/Behavioral:  Negative for dysphoric mood and suicidal ideas.     All other systems reviewed and are negative.       Current Medications       Current Outpatient Medications:     albuterol (ProAir HFA) 90 mcg/act inhaler, Inhale 2 puffs every 4 (four) hours as needed for wheezing (asthma), Disp: 1 Inhaler, Rfl: 5    calcium-vitamin D 250-100 MG-UNIT per tablet, Take 1 tablet by mouth 2 (two) times a day, Disp: , Rfl:     doxycycline hyclate (VIBRAMYCIN) 100 mg capsule, Take 2 capsules (200 mg total) by mouth 1 (one) time for 1 dose, Disp: 2 capsule, Rfl: 0    ergocalciferol (VITAMIN D2) 50,000 units, Take 1 capsule (50,000 Units total) by mouth once a week, Disp: 12 capsule, Rfl: 0    estradiol (ESTRACE) 0.1 mg/g vaginal cream, Insert 2 g into the vagina daily, Disp: , Rfl:     Triamcinolone Acetonide 55 MCG/ACT AERO, 2 sprays into each nostril daily (nasocort) (Patient not taking: Reported on 11/18/2023), Disp: , Rfl:     Current Allergies     Allergies as of 11/18/2023 - Reviewed 11/18/2023   Allergen Reaction Noted    Albumen, egg - food allergy Facial Swelling 12/17/2019    Clarithromycin Hives 06/23/2020    Penicillin g  07/07/2020    Penicillins Hives 06/12/2020    Quinolones Rash 12/17/2019    Sulfa antibiotics Rash 07/07/2020            The following portions of the patient's history were reviewed and updated as appropriate: allergies, current medications, past family history, past medical history, past social history, past surgical history and problem list.     Past Medical History:   Diagnosis Date    Allergies     Asthma        Past Surgical History:   Procedure Laterality Date    KNEE ARTHROPLASTY      KNEE ARTHROSCOPY Right     [place]Y EXCISION TUMOR SOFT TISSUE SHOULDER SUBQ 3 CM/> Right 7/28/2020    Procedure: EXCISION BIOPSY TISSUE LESION/MASS right posterior shoulder;  Surgeon: La Limon DO;  Location: [facility];  Service: General    SINUS SURGERY      TONSILLECTOMY      TONSILLECTOMY         Family History   Problem Relation Age of Onset    No Known Problems Mother     No Known Problems Father Bone cancer Maternal Grandmother     No Known Problems Maternal Grandfather     No Known Problems Paternal Grandmother     No Known Problems Paternal Grandfather     No Known Problems Maternal Aunt     Breast cancer Cousin 64        Maternal         Medications have been verified. Objective   /74   Pulse 74   Temp 97.6 °F (36.4 °C)   Resp 20   SpO2 99%        Physical Exam     Physical Exam  Constitutional:       General: She is not in acute distress. Appearance: Normal appearance. HENT:      Head: Normocephalic and atraumatic. Eyes:      Extraocular Movements: Extraocular movements intact. Conjunctiva/sclera: Conjunctivae normal.   Cardiovascular:      Rate and Rhythm: Normal rate. Pulmonary:      Effort: Pulmonary effort is normal. No respiratory distress. Skin:     General: Skin is warm and dry. Neurological:      General: No focal deficit present. Mental Status: She is alert and oriented to person, place, and time.    Psychiatric:         Mood and Affect: Mood normal.         Behavior: Behavior normal.

## 2023-12-18 ENCOUNTER — OFFICE VISIT (OUTPATIENT)
Dept: FAMILY MEDICINE CLINIC | Facility: CLINIC | Age: 63
End: 2023-12-18
Payer: COMMERCIAL

## 2023-12-18 VITALS
WEIGHT: 132 LBS | SYSTOLIC BLOOD PRESSURE: 128 MMHG | TEMPERATURE: 97.2 F | RESPIRATION RATE: 20 BRPM | DIASTOLIC BLOOD PRESSURE: 80 MMHG | HEART RATE: 84 BPM | HEIGHT: 64 IN | BODY MASS INDEX: 22.53 KG/M2

## 2023-12-18 DIAGNOSIS — S00.462A TICK BITE OF LEFT EAR, INITIAL ENCOUNTER: ICD-10-CM
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
## 2023-12-18 DIAGNOSIS — M25.50 POLYARTHRALGIA: Primary | ICD-10-CM

## 2023-12-18 DIAGNOSIS — E55.9 VITAMIN D DEFICIENCY: ICD-10-CM

## 2023-12-18 DIAGNOSIS — R51.9 FREQUENT HEADACHES: ICD-10-CM

## 2023-12-18 PROCEDURE — 99214 OFFICE O/P EST MOD 30 MIN: CPT | Performed by: FAMILY MEDICINE

## 2023-12-18 RX ORDER — DOXYCYCLINE HYCLATE 100 MG
100 TABLET ORAL 2 TIMES DAILY
Qty: 28 TABLET | Refills: 0 | Status: SHIPPED | OUTPATIENT
Start: 2023-12-18 | End: 2024-01-01

## 2023-12-18 NOTE — PROGRESS NOTES
Assessment/Plan: Patient had a tick bite to the posterior auricular area of the left ear in November she had her  removed the tick.  The ear became painful the patient went to urgent care November 18 was evaluated and given doxycycline 100 mg 2 tablets to complete 1 twice daily.  The patient now presents with headaches and increasing arthralgias.  The plan will be to perform a Lyme titer and begin doxycycline 100 mg twice daily.    History of vitamin D deficiency will reassess vitamin D level    Polyarthralgias we will check a C-reactive protein    Problem List Items Addressed This Visit    None  Visit Diagnoses     Polyarthralgia    -  Primary    Relevant Medications    doxycycline hyclate (VIBRA-TABS) 100 mg tablet    Other Relevant Orders    C-reactive protein    Lyme Total AB W Reflex to IGM/IGG    Frequent headaches        Tick bite of left ear, initial encounter        Vitamin D deficiency        Relevant Orders    Vitamin D 25 hydroxy             Diagnoses and all orders for this visit:    Polyarthralgia  -     C-reactive protein; Future  -     doxycycline hyclate (VIBRA-TABS) 100 mg tablet; Take 1 tablet (100 mg total) by mouth 2 (two) times a day for 14 days  -     Lyme Total AB W Reflex to IGM/IGG; Future    Frequent headaches    Tick bite of left ear, initial encounter    Vitamin D deficiency  -     Vitamin D 25 hydroxy; Future        No problem-specific Assessment & Plan notes found for this encounter.      PHQ-2/9 Depression Screening    Little interest or pleasure in doing things: 0 - not at all  Feeling down, depressed, or hopeless: 0 - not at all  PHQ-2 Score: 0  PHQ-2 Interpretation: Negative depression screen          Body mass index is 23.02 kg/m².    BMI Counseling: Body mass index is 23.02 kg/m². The BMI     Subjective:      Patient ID: Chidi Peterson is a 63 y.o. female.    Patient presents with a chief complaint ofHeadaches x 3 weeks joint pain abdominal pain  fatigue    Headache  Abdominal Pain  Associated symptoms include arthralgias and headaches. Pertinent negatives include no dysuria, fever, hematuria or vomiting.   Fatigue  Associated symptoms include abdominal pain, arthralgias, fatigue and headaches. Pertinent negatives include no chest pain, chills, coughing, fever, rash, sore throat or vomiting.       The following portions of the patient's history were reviewed and updated as appropriate:   She has a past medical history of Allergies and Asthma.,  does not have any pertinent problems on file.,   has a past surgical history that includes TONSILLECTOMY; Knee Arthroplasty; Tonsillectomy; Sinus surgery; Knee arthroscopy (Right); and pr excision tumor soft tissue shoulder subq 3 cm/> (Right, 7/28/2020).,  family history includes Bone cancer in her maternal grandmother; Breast cancer (age of onset: 56) in her cousin; No Known Problems in her father, maternal aunt, maternal grandfather, mother, paternal grandfather, and paternal grandmother.,   reports that she has never smoked. She has never used smokeless tobacco. She reports current alcohol use. She reports that she does not use drugs.,  is allergic to albumen, egg - food allergy; clarithromycin; penicillin g; penicillins; quinolones; and sulfa antibiotics..  Current Outpatient Medications   Medication Sig Dispense Refill   • doxycycline hyclate (VIBRA-TABS) 100 mg tablet Take 1 tablet (100 mg total) by mouth 2 (two) times a day for 14 days 28 tablet 0   • albuterol (ProAir HFA) 90 mcg/act inhaler Inhale 2 puffs every 4 (four) hours as needed for wheezing (asthma) 1 Inhaler 5   • calcium-vitamin D 250-100 MG-UNIT per tablet Take 1 tablet by mouth 2 (two) times a day     • ergocalciferol (VITAMIN D2) 50,000 units Take 1 capsule (50,000 Units total) by mouth once a week 12 capsule 0   • estradiol (ESTRACE) 0.1 mg/g vaginal cream Insert 2 g into the vagina daily     • Triamcinolone Acetonide 55 MCG/ACT AERO 2 sprays  "into each nostril daily (nasocort) (Patient not taking: Reported on 11/18/2023)       No current facility-administered medications for this visit.       Review of Systems   Constitutional:  Positive for fatigue. Negative for chills and fever.   HENT:  Negative for ear pain and sore throat.    Eyes:  Negative for pain and visual disturbance.   Respiratory:  Negative for cough and shortness of breath.    Cardiovascular:  Negative for chest pain and palpitations.   Gastrointestinal:  Positive for abdominal pain. Negative for vomiting.   Genitourinary:  Negative for dysuria and hematuria.   Musculoskeletal:  Positive for arthralgias. Negative for back pain.   Skin:  Negative for color change and rash.   Neurological:  Positive for headaches. Negative for seizures and syncope.   All other systems reviewed and are negative.        Objective:    /80   Pulse 84   Temp (!) 97.2 °F (36.2 °C)   Resp 20   Ht 5' 3.5\" (1.613 m)   Wt 59.9 kg (132 lb)   BMI 23.02 kg/m²   Body mass index is 23.02 kg/m².     Physical Exam  Constitutional:       Appearance: Normal appearance. She is well-developed.   HENT:      Head: Normocephalic and atraumatic.      Right Ear: Tympanic membrane, ear canal and external ear normal.      Left Ear: Tympanic membrane, ear canal and external ear normal.      Nose: Nose normal.      Mouth/Throat:      Mouth: Mucous membranes are moist.      Pharynx: Oropharynx is clear.   Eyes:      Extraocular Movements: Extraocular movements intact.      Conjunctiva/sclera: Conjunctivae normal.      Pupils: Pupils are equal, round, and reactive to light.   Cardiovascular:      Rate and Rhythm: Normal rate and regular rhythm.      Pulses: Normal pulses.      Heart sounds: Normal heart sounds.   Pulmonary:      Effort: Pulmonary effort is normal.      Breath sounds: Normal breath sounds.   Abdominal:      General: Abdomen is flat. Bowel sounds are normal.      Palpations: Abdomen is soft.      Tenderness: There " is no abdominal tenderness.   Musculoskeletal:         General: Normal range of motion.      Cervical back: Normal range of motion and neck supple.   Skin:     General: Skin is warm and dry.      Capillary Refill: Capillary refill takes less than 2 seconds.   Neurological:      General: No focal deficit present.      Mental Status: She is alert and oriented to person, place, and time.   Psychiatric:         Mood and Affect: Mood normal.         Behavior: Behavior normal.         Thought Content: Thought content normal.         Judgment: Judgment normal.

## 2023-12-19 ENCOUNTER — APPOINTMENT (OUTPATIENT)
Dept: LAB | Facility: HOSPITAL | Age: 63
End: 2023-12-19
Payer: COMMERCIAL

## 2023-12-19 DIAGNOSIS — E78.49 OTHER HYPERLIPIDEMIA: ICD-10-CM

## 2023-12-19 DIAGNOSIS — E55.9 VITAMIN D DEFICIENCY: ICD-10-CM

## 2023-12-19 DIAGNOSIS — M25.50 POLYARTHRALGIA: ICD-10-CM

## 2023-12-19 LAB
25(OH)D3 SERPL-MCNC: 33 NG/ML (ref 30–100)
CHOLEST SERPL-MCNC: 194 MG/DL
CRP SERPL QL: 1.2 MG/L
HDLC SERPL-MCNC: 68 MG/DL
LDLC SERPL CALC-MCNC: 93 MG/DL (ref 0–100)
TRIGL SERPL-MCNC: 165 MG/DL

## 2023-12-19 PROCEDURE — 86618 LYME DISEASE ANTIBODY: CPT

## 2023-12-19 PROCEDURE — 80061 LIPID PANEL: CPT

## 2023-12-19 PROCEDURE — 82306 VITAMIN D 25 HYDROXY: CPT

## 2023-12-19 PROCEDURE — 36415 COLL VENOUS BLD VENIPUNCTURE: CPT

## 2023-12-19 PROCEDURE — 86140 C-REACTIVE PROTEIN: CPT

## 2023-12-20 LAB — B BURGDOR IGG+IGM SER QL IA: NEGATIVE

## 2023-12-22 ENCOUNTER — TELEPHONE (OUTPATIENT)
Dept: FAMILY MEDICINE CLINIC | Facility: CLINIC | Age: 63
End: 2023-12-22

## 2023-12-22 NOTE — TELEPHONE ENCOUNTER
Patient has reviewed her lab work with the lyme titre and CRP being negative     Patient would like to know if you feel that she should continue with doxycycline    Also her tops of feet Bilateral are a little red - almost like sunburn - ? Reaction - but has had the medication prior w/o problem - does have a podiatry apt but not until 1/29/24    Please advise

## 2024-01-29 ENCOUNTER — HOSPITAL ENCOUNTER (OUTPATIENT)
Dept: RADIOLOGY | Facility: CLINIC | Age: 64
Discharge: HOME | End: 2024-01-29
Payer: COMMERCIAL

## 2024-01-29 ENCOUNTER — OFFICE VISIT (OUTPATIENT)
Dept: PODIATRY | Age: 64
End: 2024-01-29
Payer: COMMERCIAL

## 2024-01-29 VITALS
BODY MASS INDEX: 22.4 KG/M2 | OXYGEN SATURATION: 99 % | WEIGHT: 131.2 LBS | HEIGHT: 64 IN | HEART RATE: 89 BPM | DIASTOLIC BLOOD PRESSURE: 78 MMHG | SYSTOLIC BLOOD PRESSURE: 130 MMHG | TEMPERATURE: 97.2 F

## 2024-01-29 DIAGNOSIS — M79.671 PAIN IN BOTH FEET: ICD-10-CM

## 2024-01-29 DIAGNOSIS — M79.672 PAIN IN BOTH FEET: Primary | ICD-10-CM

## 2024-01-29 DIAGNOSIS — M79.671 PAIN IN BOTH FEET: Primary | ICD-10-CM

## 2024-01-29 DIAGNOSIS — M19.079 ARTHRITIS OF FOOT: ICD-10-CM

## 2024-01-29 DIAGNOSIS — M79.672 PAIN IN BOTH FEET: ICD-10-CM

## 2024-01-29 PROCEDURE — 73630 X-RAY EXAM OF FOOT: CPT

## 2024-01-29 PROCEDURE — 99203 OFFICE O/P NEW LOW 30 MIN: CPT | Performed by: STUDENT IN AN ORGANIZED HEALTH CARE EDUCATION/TRAINING PROGRAM

## 2024-01-29 RX ORDER — DOXYCYCLINE HYCLATE 100 MG/1
100 CAPSULE ORAL
COMMUNITY
Start: 2023-11-18

## 2024-01-29 NOTE — PROGRESS NOTES
Assessment/Plan:     Diagnoses and all orders for this visit:    Pain in both feet  -     X-ray foot right 3+ views; Future  -     X-ray foot left 3+ views; Future  -     FL guided needle plac bx/asp/inj; Future    Arthritis of foot  -     FL guided needle plac bx/asp/inj; Future    Other orders  -     doxycycline hyclate (VIBRAMYCIN) 100 mg capsule; Take 100 mg by mouth (Patient not taking: Reported on 1/29/2024)          Imaging Reviewed at this visit (I personally reviewed/independently interpreted images and reports in PACS)  XR right foot WB 3v 1/29/24: HAV with ALLIE 16-17, sesamoid position 5-6, mild 1st MTPJ arthritic changes, shortening 1st met. 2nd tarsometatarsal joint arthritic changes. No acute osseous abnormalities noted.   XR left foot WB 3v 1/29/24: HAV with ALLIE 16-17, sesamoid position 5-6, mild 1st MTPJ arthritic changes. Midfoot arthritic changes. 2nd tarsometatarsal joint arthritic changes. No acute osseous abnormalities noted.       IMPRESSION:  B/L foot pain. Differential diagnosis include metatarsalgia (retrograde pressure from Hts, equinus), neuroma, 2nd TMTJ arthritis, other degenerative changes (calcaneal spurs, arthrosis of the joints of the foot), and inflammatory conditions of the ligaments and fascia of the foot and ankle.   HAV     PLAN:  I reviewed clinical exam and radiographic imaging (XR) with patient in detail today. I have discussed with the patient the pathophysiology of this diagnosis and reviewed how the examination correlates with this diagnosis.  I reviewed PCP note from 12/18/23  I explained B/L foot pain differentials as above.   I recommended topical pain creams PRN, achilles tendon stretching  I recommend stiff bottomed sneakers/shoes (ex Asics, Vionic, New balance, [place], etc) for daily use and Rona Martinez (recovery slides) for in home use.   I applied a dancers pad and metatarsal pad for trial use. Will possibly add to CMOs if these help  I discussed trial arch binder.  "  I recommended the addition of PT however patient defers  I discussed gabapentin, pain mngmt, rheumatology consult however pt defers  I ordered B/L FL-guided 2nd TMTJ steroid injections  F/u 6wks      Subjective:      Patient ID: Chidi Peterson is a 63 y.o. female.    Chidi presents to clinic today concerning B/L foot pain. Has h/o neuromas with injections which helps. Uses CMOs 20yrs, last got new pair last year.   Bit by a tick in November and was given Doxy - notes redness to tops of feet - stopped abx as could be reaction. Notes foot pain to joints have been progressively worse.    Has seen rheumatologist in past and told she had OA.   Pain to tops of midfoot and bottoms of midfoot/MTPJs, right ankle anterior and back of right calf.         The following portions of the patient's history were reviewed and updated as appropriate: allergies, current medications, past family history, past medical history, past social history, past surgical history, and problem list.    Review of Systems   Constitutional:  Negative for activity change, chills and fever.   HENT: Negative.     Respiratory:  Negative for cough, chest tightness and shortness of breath.    Cardiovascular:  Negative for chest pain and leg swelling.   Endocrine: Negative.    Genitourinary: Negative.    Musculoskeletal:  Positive for gait problem (B/L foot pain).   Neurological:  Negative for numbness.   Psychiatric/Behavioral: Negative.  Negative for agitation and behavioral problems.          Objective:      /78   Pulse 89   Temp (!) 97.2 °F (36.2 °C) (Temporal)   Ht 5' 3.5\" (1.613 m)   Wt 59.5 kg (131 lb 3.2 oz)   SpO2 99%   BMI 22.88 kg/m²          Physical Exam  Constitutional:       Appearance: Normal appearance. She is not ill-appearing.   Cardiovascular:      Pulses: Normal pulses.      Comments: Bilateral DP & PT pulses 2/4. CRT WNL. Pedal hair present. Feet warm and well perfused.   Pulmonary:      Effort: Pulmonary effort is " normal. No respiratory distress.   Musculoskeletal:         General: Tenderness (B/L feet along 2-4 distal metatarsals dorsla and plantar allen to heads, 2nd TMTJ/midfoot region) and deformity (B/L HAV with HTs) present. Normal range of motion.      Comments: There is decreased ankle DF with knee extended and flexed indicating gastroc-soleal equinus.   Pain with ROM 2nd TMTJs.  Bilateral ankle, STJ, TNJ, TMTJ, MTPJ ROM WNL and without pain. LE muscle strength WNL.   Skin:     General: Skin is warm.      Capillary Refill: Capillary refill takes less than 2 seconds.      Findings: No erythema or lesion.   Neurological:      General: No focal deficit present.      Mental Status: She is alert and oriented to person, place, and time.      Sensory: No sensory deficit.      Comments: Gross sensation intact. Denies N/T/B to B/L feet.    Psychiatric:         Mood and Affect: Mood normal.

## 2024-01-29 NOTE — PATIENT INSTRUCTIONS
Dancers pad (right foot)  Metatarsal pad (left foot)  Arch binder    Foot Pain Home Therapy    Stretch. Place painful foot in back with heel on ground and lean against wall. Do not lift heel off of ground. You will feel the stretch in the calf muscles and possibly the heel. Hold the stretch for 20-30 seconds. Do this at least 5-6 times per day. It is best done after exercise when your muscles are warm.  Wear supportive shoes at all times. Avoid flip-flops, flat sandals, barefoot walking (never walk barefoot, even at home). Generally avoid shoes that are too flexible and bend in the arch. Your shoes should only slightly bend in the toe area, not the middle. Running sneakers are often the best choice.  Supportive sneaker brands: Davenport, On Cloud, Hoka, Altra, New Balance, Asics, Mizuno  [employer] (discount if mention Dr barnes)  [facility]et [facility]et [facility]   [employer] [place]  Supportive daily shoe brands: Vionic, Orthofeet, Valarie, Dansko, Chacos, Smith, Teva, Birkenstock  Supportive home shoes: Rona Martinez (recovery slides)*  Purchase over the counter topical pain creams such as Voltarin gel, biofreeze, or CBD cream - will need to apply 2-3 times per day for benefit. + deep tissue massage.       Achilles Tendon Stretching Exercises    A) Standing Gastrocnemius stretch  Place hands on wall or chair. If using wall, put your hands at eye level.  Step the leg you want to stretch behind you. Keep your back heel on the floor and point your toes straight ahead or slightly inward towards the heel of the opposite foot.   Bend your knee toward the wall while keeping your back leg straight.  Lean toward the wall until you feel a gentle stretch in you calf of the straight leg. Don't lean so far that you feel pain.  Hold for 15 seconds. Complete 3 reps.    B) Standing soleus stretch  Place your hands on the wall or chair. If using wall, put your hands at eye level.  Step the leg you want  to stretch behind you (your back foot will need to be closer to the front foot than the above stretch). Keep your back heel on the floor and point your toes straight ahead or slightly inward towards the heel of the opposite foot.   Bend both your front and back knee at the same time (may help to stick your butt out). You do not need to lean towards the wall, just bend the knees.   Lean toward the wall until you feel a gentle stretch in you calf of the straight leg. Don't lean so far that you feel pain.  Hold for 15 seconds. Complete 3 reps.          Keep these tips and tricks in mind to get the most out of your stretching;  Take your time - move slowly, whether you are deepening into a stretch or changing positions. This will limit the risk of injury & discomfort.  Avoid bouncing - quick sudden movements will only worsen achilles tendon issues. Stay relaxed during stretch.  Keep your heel down and toes straight ahead or slightly inward - this will allow the achilles tendon to stretch properly.   Stop if you feel pain - Don't strain or force your muscle. If you feel sharp pain, stop immediately.

## 2024-01-31 ENCOUNTER — TELEPHONE (OUTPATIENT)
Dept: PODIATRY | Age: 64
End: 2024-01-31

## 2024-01-31 NOTE — TELEPHONE ENCOUNTER
----- Message from Magnus Peterson on behalf of Chidi Peterson sent at 1/29/2024  9:03 PM EST -----  Regarding: Foot Pads  Contact: [phone]  HI! I was wondering if you could please send the actual names of the 2 different foot pads you gave me to try? I did not see the names in the paper you gave me. I know you said that I can get them on [employer].  Thank You,  Chidi Peterson

## 2024-02-26 ENCOUNTER — OFFICE VISIT (OUTPATIENT)
Dept: FAMILY MEDICINE CLINIC | Facility: CLINIC | Age: 64
End: 2024-02-26
Payer: COMMERCIAL

## 2024-02-26 VITALS
HEART RATE: 68 BPM | BODY MASS INDEX: 22.36 KG/M2 | TEMPERATURE: 98.1 F | WEIGHT: 131 LBS | DIASTOLIC BLOOD PRESSURE: 68 MMHG | HEIGHT: 64 IN | RESPIRATION RATE: 16 BRPM | SYSTOLIC BLOOD PRESSURE: 116 MMHG

## 2024-02-26 DIAGNOSIS — M25.50 POLYARTHRALGIA: ICD-10-CM

## 2024-02-26 DIAGNOSIS — J45.909 ASTHMA, UNSPECIFIED ASTHMA SEVERITY, UNSPECIFIED WHETHER COMPLICATED, UNSPECIFIED WHETHER PERSISTENT: Primary | ICD-10-CM

## 2024-02-26 DIAGNOSIS — J45.909 ASTHMA, UNSPECIFIED ASTHMA SEVERITY, UNSPECIFIED WHETHER COMPLICATED, UNSPECIFIED WHETHER PERSISTENT: ICD-10-CM

## 2024-02-26 DIAGNOSIS — E55.9 VITAMIN D DEFICIENCY: ICD-10-CM

## 2024-02-26 DIAGNOSIS — Z13.220 SCREENING FOR CHOLESTEROL LEVEL: ICD-10-CM

## 2024-02-26 PROCEDURE — 99213 OFFICE O/P EST LOW 20 MIN: CPT | Performed by: FAMILY MEDICINE

## 2024-02-26 RX ORDER — ALBUTEROL SULFATE 90 UG/1
2 AEROSOL, METERED RESPIRATORY (INHALATION) EVERY 4 HOURS PRN
Qty: 18 G | Refills: 1 | Status: SHIPPED | OUTPATIENT
Start: 2024-02-26 | End: 2024-02-27

## 2024-02-26 NOTE — PROGRESS NOTES
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    Asthma the patient uses the rescue inhaler less than once weekly    Problem List Items Addressed This Visit    None  Visit Diagnoses     Asthma, unspecified asthma severity, unspecified whether complicated, unspecified whether persistent    -  Primary    Relevant Medications    albuterol (ProAir HFA) 90 mcg/act inhaler    Other Relevant Orders    CBC and differential    Polyarthralgia        Relevant Orders    CBC and differential    Screening for cholesterol level        Relevant Orders    Comprehensive metabolic panel    Lipid Panel with Direct LDL reflex    Vitamin D deficiency        Relevant Orders    Vitamin D 25 hydroxy           Diagnoses and all orders for this visit:    Asthma, unspecified asthma severity, unspecified whether complicated, unspecified whether persistent  -     CBC and differential; Future  -     albuterol (ProAir HFA) 90 mcg/act inhaler; Inhale 2 puffs every 4 (four) hours as needed for wheezing (asthma)    Polyarthralgia  -     CBC and differential; Future    Screening for cholesterol level  -     Comprehensive metabolic panel; Future  -     Lipid Panel with Direct LDL reflex; Future    Vitamin D deficiency  -     Vitamin D 25 hydroxy; Future        No problem-specific Assessment & Plan notes found for this encounter.      PHQ-2/9 Depression Screening            Body mass index is 22.84 kg/m².    BMI Counseling: Body mass index is 22.84 kg/m². The BMI     Subjective:      Patient ID: Chidi Peterson is a 63 y.o. female.    Patient presents for 6-month checkup    Back Pain  Pertinent negatives include no abdominal pain, chest pain, dysuria or fever.   Hand Pain   Pertinent negatives include no chest pain.       The following portions of the patient's history were reviewed and updated as appropriate:   She has a past medical history of Allergies and Asthma.,  does not have any pertinent  problems on file.,   has a past surgical history that includes TONSILLECTOMY; Knee Arthroplasty; Tonsillectomy; Sinus surgery; Knee arthroscopy (Right); and pr excision tumor soft tissue shoulder subq 3 cm/> (Right, 7/28/2020).,  family history includes Bone cancer in her maternal grandmother; Breast cancer (age of onset: 56) in her cousin; No Known Problems in her father, maternal aunt, maternal grandfather, mother, paternal grandfather, and paternal grandmother.,   reports that she has never smoked. She has never used smokeless tobacco. She reports current alcohol use. She reports that she does not use drugs.,  is allergic to albumen, egg - food allergy; clarithromycin; penicillin g; penicillins; quinidine; quinolones; and sulfa antibiotics..  Current Outpatient Medications   Medication Sig Dispense Refill   • albuterol (ProAir HFA) 90 mcg/act inhaler Inhale 2 puffs every 4 (four) hours as needed for wheezing (asthma) 18 g 1   • estradiol (ESTRACE) 0.1 mg/g vaginal cream Insert 2 g into the vagina daily     • calcium-vitamin D 250-100 MG-UNIT per tablet Take 1 tablet by mouth 2 (two) times a day (Patient not taking: Reported on 1/29/2024)     • doxycycline hyclate (VIBRAMYCIN) 100 mg capsule Take 100 mg by mouth (Patient not taking: Reported on 1/29/2024)     • ergocalciferol (VITAMIN D2) 50,000 units Take 1 capsule (50,000 Units total) by mouth once a week (Patient not taking: Reported on 2/26/2024) 12 capsule 0   • Triamcinolone Acetonide 55 MCG/ACT AERO 2 sprays into each nostril daily (nasocort) (Patient not taking: Reported on 11/18/2023)       No current facility-administered medications for this visit.       Review of Systems   Constitutional:  Negative for chills and fever.   HENT:  Negative for ear pain and sore throat.    Eyes:  Negative for pain and visual disturbance.   Respiratory:  Negative for cough and shortness of breath.    Cardiovascular:  Negative for chest pain and palpitations.  "  Gastrointestinal:  Negative for abdominal pain and vomiting.   Genitourinary:  Negative for dysuria and hematuria.   Musculoskeletal:  Positive for arthralgias and back pain.        Osteoarthritic pain of the feet and sacroiliac pain   Skin:  Negative for color change and rash.   Neurological:  Negative for seizures and syncope.   All other systems reviewed and are negative.        Objective:    /68   Pulse 68   Temp 98.1 °F (36.7 °C)   Resp 16   Ht 5' 3.5\" (1.613 m)   Wt 59.4 kg (131 lb)   BMI 22.84 kg/m²   Body mass index is 22.84 kg/m².     Physical Exam  Constitutional:       Appearance: Normal appearance. She is well-developed.   HENT:      Head: Normocephalic and atraumatic.      Right Ear: Tympanic membrane, ear canal and external ear normal.      Left Ear: Tympanic membrane, ear canal and external ear normal.      Nose: Nose normal.      Mouth/Throat:      Mouth: Mucous membranes are moist.      Pharynx: Oropharynx is clear.   Eyes:      Extraocular Movements: Extraocular movements intact.      Conjunctiva/sclera: Conjunctivae normal.      Pupils: Pupils are equal, round, and reactive to light.   Cardiovascular:      Rate and Rhythm: Normal rate and regular rhythm.      Pulses: Normal pulses.      Heart sounds: Normal heart sounds.   Pulmonary:      Effort: Pulmonary effort is normal.      Breath sounds: Normal breath sounds.   Abdominal:      General: Abdomen is flat. Bowel sounds are normal.      Palpations: Abdomen is soft.      Tenderness: There is no abdominal tenderness.   Musculoskeletal:         General: Normal range of motion.      Cervical back: Normal range of motion and neck supple.      Comments: Mild osteoarthritic deformity of the hands   Skin:     General: Skin is warm and dry.      Capillary Refill: Capillary refill takes less than 2 seconds.   Neurological:      General: No focal deficit present.      Mental Status: She is alert and oriented to person, place, and time. "   Psychiatric:         Mood and Affect: Mood normal.         Behavior: Behavior normal.         Thought Content: Thought content normal.         Judgment: Judgment normal.

## 2024-02-27 RX ORDER — LEVALBUTEROL TARTRATE 45 UG/1
2 AEROSOL, METERED ORAL EVERY 4 HOURS PRN
Qty: 18 G | Refills: 3 | Status: SHIPPED | OUTPATIENT
Start: 2024-02-27

## 2024-02-29 ENCOUNTER — TELEPHONE (OUTPATIENT)
Dept: RADIOLOGY | Facility: HOSPITAL | Age: 64
End: 2024-02-29

## 2024-02-29 NOTE — NURSING NOTE
During telephone call message, RN did inform pt that she will require a  for 3/1/2024 L and R Foot CSI due to location of injections.

## 2024-02-29 NOTE — NURSING NOTE
Call placed to pt to discuss upcoming appointment at [facility] Radiology Department.  No answer.  Information left on pts voicemail.  Pt is having a L and R Foot CSI completed on 3/1/2024.  Pre procedure instructions given including diet and taking own medications.  Instructed pt that she may eat normally and take medications as usual before the procedure.  Reminded pt of the location, date and time of procedure.  Phone number given for pt to call with any questions.

## 2024-03-01 ENCOUNTER — HOSPITAL ENCOUNTER (OUTPATIENT)
Dept: RADIOLOGY | Facility: HOSPITAL | Age: 64
End: 2024-03-01
Attending: STUDENT IN AN ORGANIZED HEALTH CARE EDUCATION/TRAINING PROGRAM
Payer: COMMERCIAL

## 2024-03-01 DIAGNOSIS — M79.672 PAIN IN LEFT FOOT: ICD-10-CM

## 2024-03-01 DIAGNOSIS — M79.671 PAIN IN RIGHT FOOT: ICD-10-CM

## 2024-03-01 PROCEDURE — 77002 NEEDLE LOCALIZATION BY XRAY: CPT

## 2024-03-01 PROCEDURE — 20610 DRAIN/INJ JOINT/BURSA W/O US: CPT

## 2024-03-01 RX ORDER — METHYLPREDNISOLONE ACETATE 40 MG/ML
40 INJECTION, SUSPENSION INTRA-ARTICULAR; INTRALESIONAL; INTRAMUSCULAR; SOFT TISSUE ONCE
Qty: 1 ML | Refills: 0 | Status: DISCONTINUED | OUTPATIENT
Start: 2024-03-01 | End: 2024-03-01

## 2024-03-01 RX ORDER — METHYLPREDNISOLONE ACETATE 40 MG/ML
40 INJECTION, SUSPENSION INTRA-ARTICULAR; INTRALESIONAL; INTRAMUSCULAR; SOFT TISSUE
Status: COMPLETED | OUTPATIENT
Start: 2024-03-01 | End: 2024-03-01

## 2024-03-01 RX ORDER — ROPIVACAINE HYDROCHLORIDE 2 MG/ML
20 INJECTION, SOLUTION EPIDURAL; INFILTRATION; PERINEURAL ONCE
Status: COMPLETED | OUTPATIENT
Start: 2024-03-01 | End: 2024-03-01

## 2024-03-01 RX ORDER — LIDOCAINE HYDROCHLORIDE 10 MG/ML
5 INJECTION, SOLUTION EPIDURAL; INFILTRATION; INTRACAUDAL; PERINEURAL ONCE
Qty: 5 ML | Refills: 0 | Status: DISCONTINUED | OUTPATIENT
Start: 2024-03-01 | End: 2024-03-01

## 2024-03-01 RX ORDER — LIDOCAINE HYDROCHLORIDE 10 MG/ML
5 INJECTION, SOLUTION EPIDURAL; INFILTRATION; INTRACAUDAL; PERINEURAL
Status: COMPLETED | OUTPATIENT
Start: 2024-03-01 | End: 2024-03-01

## 2024-03-01 RX ADMIN — IOHEXOL 2 ML: 300 INJECTION, SOLUTION INTRAVENOUS at 11:41

## 2024-03-01 RX ADMIN — ROPIVACAINE HYDROCHLORIDE 2 ML: 2 INJECTION, SOLUTION EPIDURAL; INFILTRATION at 11:25

## 2024-03-01 RX ADMIN — IOHEXOL 2 ML: 300 INJECTION, SOLUTION INTRAVENOUS at 11:42

## 2024-03-01 RX ADMIN — METHYLPREDNISOLONE ACETATE 40 MG: 40 INJECTION, SUSPENSION INTRA-ARTICULAR; INTRALESIONAL; INTRAMUSCULAR; INTRASYNOVIAL; SOFT TISSUE at 11:24

## 2024-03-01 RX ADMIN — LIDOCAINE HYDROCHLORIDE 5 ML: 10 INJECTION, SOLUTION EPIDURAL; INFILTRATION; INTRACAUDAL; PERINEURAL at 11:46

## 2024-03-01 RX ADMIN — METHYLPREDNISOLONE ACETATE 40 MG: 40 INJECTION, SUSPENSION INTRA-ARTICULAR; INTRALESIONAL; INTRAMUSCULAR; INTRASYNOVIAL; SOFT TISSUE at 11:44

## 2024-03-01 RX ADMIN — LIDOCAINE HYDROCHLORIDE 5 ML: 10 INJECTION, SOLUTION EPIDURAL; INFILTRATION; INTRACAUDAL at 11:21

## 2024-03-11 ENCOUNTER — OFFICE VISIT (OUTPATIENT)
Dept: PODIATRY | Age: 64
End: 2024-03-11
Payer: COMMERCIAL

## 2024-03-11 VITALS
SYSTOLIC BLOOD PRESSURE: 118 MMHG | WEIGHT: 130.4 LBS | TEMPERATURE: 98.4 F | BODY MASS INDEX: 22.26 KG/M2 | HEART RATE: 77 BPM | DIASTOLIC BLOOD PRESSURE: 76 MMHG | HEIGHT: 64 IN | OXYGEN SATURATION: 98 %

## 2024-03-11 DIAGNOSIS — M77.42 METATARSALGIA OF BOTH FEET: ICD-10-CM

## 2024-03-11 DIAGNOSIS — M79.671 PAIN IN BOTH FEET: Primary | ICD-10-CM

## 2024-03-11 DIAGNOSIS — M77.41 METATARSALGIA OF BOTH FEET: ICD-10-CM

## 2024-03-11 DIAGNOSIS — M19.079 ARTHRITIS OF FOOT: ICD-10-CM

## 2024-03-11 DIAGNOSIS — M79.672 PAIN IN BOTH FEET: Primary | ICD-10-CM

## 2024-03-11 PROCEDURE — 99213 OFFICE O/P EST LOW 20 MIN: CPT | Performed by: STUDENT IN AN ORGANIZED HEALTH CARE EDUCATION/TRAINING PROGRAM

## 2024-03-11 NOTE — PATIENT INSTRUCTIONS
Wear supportive shoes at all times. Avoid flip-flops, flat sandals, barefoot walking (never walk barefoot, even at home). Generally avoid shoes that are too flexible and bend in the arch. Your shoes should only slightly bend in the toe area, not the middle. Running sneakers are often the best choice.  Supportive sneaker brands: Davenport, On Cloud, Hoka, Altra, New Balance, Asics, Mizuno  Fleet Feet [facility] [employer] [place]  Supportive daily shoe brands: Vionic, Orthofeet, Valarie, Dansko, Chacos, Smith, Teva, Birkenstock  Supportive home shoes: Rona Martinez (recovery slides)

## 2024-03-11 NOTE — PROGRESS NOTES
"Assessment/Plan:     Diagnoses and all orders for this visit:    Pain in both feet  -     Orthotics B/L  -     Ambulatory referral to Physical Therapy; Future    Arthritis of foot  -     Orthotics B/L  -     Ambulatory referral to Physical Therapy; Future    Metatarsalgia of both feet  -     Orthotics B/L  -     Ambulatory referral to Physical Therapy; Future            Prior Imaging  XR right foot WB 3v 1/29/24: HAV with ALLIE 16-17, sesamoid position 5-6, mild 1st MTPJ arthritic changes, shortening 1st met. 2nd tarsometatarsal joint arthritic changes. No acute osseous abnormalities noted.   XR left foot WB 3v 1/29/24: HAV with ALLIE 16-17, sesamoid position 5-6, mild 1st MTPJ arthritic changes. Midfoot arthritic changes. 2nd tarsometatarsal joint arthritic changes. No acute osseous abnormalities noted.       IMPRESSION:  B/L foot pain. Differential diagnosis include metatarsalgia (retrograde pressure from Hts, equinus), neuroma, 2nd TMTJ arthritis, other degenerative changes (calcaneal spurs, arthrosis of the joints of the foot), and inflammatory conditions of the ligaments and fascia of the foot and ankle.   HAV     PLAN:  B/L foot pain with some improvement with conservative care. S/p B/L 2nd TMTJ FL-guided injections 3/1/24.   C/w topical pain creams PRN, achilles tendon stretching, stiff bottomed sneakers/shoes (ex Asics, Vionic, New balance, Davenport, etc) for daily use and Oofos, Hoka (recovery slides) for in home use.   CMOs ordered (to be done SL-PT)  I did order PT however pt states she may not go  I discussed gabapentin, pain mngmt, rheumatology consult however pt defers  I reviewed PCP note from 2/26/24  F/u 3 months for recheck      Subjective:      Patient ID: Chidi Peterson is a 63 y.o. female.    Chidi presents to clinic today concerning B/L foot pain. Pads did not help, injections did help L>R midfoot. Notes continued soreness to bottom of forefoot, pads did not really help.     \"Has h/o neuromas " "with injections which helps. Uses CMOs 20yrs, last got new pair last year.   Bit by a tick in November and was given Doxy - notes redness to tops of feet - stopped abx as could be reaction. Notes foot pain to joints have been progressively worse.    Has seen rheumatologist in past and told she had OA.   Pain to tops of midfoot and bottoms of midfoot/MTPJs, right ankle anterior and back of right calf.\"         The following portions of the patient's history were reviewed and updated as appropriate: allergies, current medications, past family history, past medical history, past social history, past surgical history, and problem list.    Review of Systems   Constitutional:  Negative for activity change, chills and fever.   HENT: Negative.     Respiratory:  Negative for cough, chest tightness and shortness of breath.    Cardiovascular:  Negative for chest pain and leg swelling.   Endocrine: Negative.    Genitourinary: Negative.    Musculoskeletal:  Positive for gait problem (B/L foot pain).   Neurological:  Negative for numbness.   Psychiatric/Behavioral: Negative.  Negative for agitation and behavioral problems.          Objective:      /76   Pulse 77   Temp 98.4 °F (36.9 °C) (Temporal)   Ht 5' 3.5\" (1.613 m)   Wt 59.1 kg (130 lb 6.4 oz)   SpO2 98%   BMI 22.74 kg/m²          Physical Exam  Constitutional:       Appearance: Normal appearance. She is not ill-appearing.   Cardiovascular:      Pulses: Normal pulses.      Comments: Bilateral DP & PT pulses 2/4. CRT WNL. Pedal hair present. Feet warm and well perfused.   Pulmonary:      Effort: Pulmonary effort is normal. No respiratory distress.   Musculoskeletal:         General: Tenderness (B/L feet along 2-4 distal metatarsals now only plantar met heads, scant 2nd TMTJ/midfoot region) and deformity (B/L HAV with HTs, prominent metatarsal heads with fat pad atrophy) present. Normal range of motion.      Comments: There is decreased ankle DF with knee extended " and flexed indicating gastroc-soleal equinus.   Scant pain with ROM 2nd TMTJs, R>L.  Bilateral ankle, STJ, TNJ, TMTJ, MTPJ ROM WNL and without pain. LE muscle strength WNL.   Skin:     General: Skin is warm.      Capillary Refill: Capillary refill takes less than 2 seconds.      Findings: No erythema or lesion.   Neurological:      General: No focal deficit present.      Mental Status: She is alert and oriented to person, place, and time.      Sensory: No sensory deficit.      Comments: Gross sensation intact. Denies N/T/B to B/L feet.    Psychiatric:         Mood and Affect: Mood normal.

## 2024-03-14 DIAGNOSIS — J45.909 ASTHMA, UNSPECIFIED ASTHMA SEVERITY, UNSPECIFIED WHETHER COMPLICATED, UNSPECIFIED WHETHER PERSISTENT: ICD-10-CM

## 2024-03-14 RX ORDER — ALBUTEROL SULFATE 90 UG/1
2 AEROSOL, METERED RESPIRATORY (INHALATION) EVERY 4 HOURS PRN
Qty: 18 G | Refills: 5 | Status: SHIPPED | OUTPATIENT
Start: 2024-03-14

## 2024-04-15 ENCOUNTER — EVALUATION (OUTPATIENT)
Dept: PHYSICAL THERAPY | Facility: CLINIC | Age: 64
End: 2024-04-15
Payer: COMMERCIAL

## 2024-04-15 DIAGNOSIS — M79.671 PAIN IN BOTH FEET: Primary | ICD-10-CM

## 2024-04-15 DIAGNOSIS — M19.079 ARTHRITIS OF FOOT: ICD-10-CM

## 2024-04-15 DIAGNOSIS — M77.42 METATARSALGIA OF BOTH FEET: ICD-10-CM

## 2024-04-15 DIAGNOSIS — M77.41 METATARSALGIA OF BOTH FEET: ICD-10-CM

## 2024-04-15 DIAGNOSIS — M79.672 PAIN IN BOTH FEET: Primary | ICD-10-CM

## 2024-04-15 PROCEDURE — 97162 PT EVAL MOD COMPLEX 30 MIN: CPT | Performed by: PHYSICAL MEDICINE & REHABILITATION

## 2024-04-15 PROCEDURE — 97760 ORTHOTIC MGMT&TRAING 1ST ENC: CPT | Performed by: PHYSICAL MEDICINE & REHABILITATION

## 2024-04-15 PROCEDURE — 97530 THERAPEUTIC ACTIVITIES: CPT | Performed by: PHYSICAL MEDICINE & REHABILITATION

## 2024-04-15 NOTE — PROGRESS NOTES
"PT Evaluation /Orthotics     Today's date: 4/15/2024  Patient name: Chidi Peterson  : 1960  MRN: [id number]  Referring provider: Karen Gomes DPM  Dx:   Encounter Diagnosis     ICD-10-CM    1. Pain in both feet  M79.671 Ambulatory referral to Physical Therapy    M79.672       2. Arthritis of foot  M19.079 Ambulatory referral to Physical Therapy      3. Metatarsalgia of both feet  M77.41 Ambulatory referral to Physical Therapy    M77.42                                  SUBJECTIVE:    Per recent DPM note: \"Prior Imaging  XR right foot WB 3v 24: HAV with ALLIE 16-17, sesamoid position 5-6, mild 1st MTPJ arthritic changes, shortening 1st met. 2nd tarsometatarsal joint arthritic changes. No acute osseous abnormalities noted.   XR left foot WB 3v 24: HAV with ALLIE 16-17, sesamoid position 5-6, mild 1st MTPJ arthritic changes. Midfoot arthritic changes. 2nd tarsometatarsal joint arthritic changes. No acute osseous abnormalities noted.       IMPRESSION:  B/L foot pain. Differential diagnosis include metatarsalgia (retrograde pressure from Hts, equinus), neuroma, 2nd TMTJ arthritis, other degenerative changes (calcaneal spurs, arthrosis of the joints of the foot), and inflammatory conditions of the ligaments and fascia of the foot and ankle.   HAV     PLAN:  B/L foot pain with some improvement with conservative care. S/p B/L 2nd TMTJ FL-guided injections 3/1/24.   C/w topical pain creams PRN, achilles tendon stretching, stiff bottomed sneakers/shoes (ex Asics, Vionic, New balance, Davenport, etc) for daily use and Oofos, Hoka (recovery slides) for in home use.   CMOs ordered (to be done SL-PT)  I did order PT however pt states she may not go  I discussed gabapentin, pain mngmt, rheumatology consult however pt defers  I reviewed PCP note from 24  F/u 3 months for recheck        Subjective:       Patient ID: Chidi Peterson is a 63 y.o. female.     Chidi presents to clinic today concerning B/L " "foot pain. Pads did not help, injections did help L>R midfoot. Notes continued soreness to bottom of forefoot, pads did not really help.      \"Has h/o neuromas with injections which helps. Uses CMOs 20yrs, last got new pair last year.   Bit by a tick in November and was given Doxy - notes redness to tops of feet - stopped abx as could be reaction. Notes foot pain to joints have been progressively worse.    Has seen rheumatologist in past and told she had OA.   Pain to tops of midfoot and bottoms of midfoot/MTPJs, right ankle anterior and back of right calf.\"     PT IE 4/15:   Pt notes she got a Tic Bite on her foot end of last year; developed redness B feet/toes right after this with increased pain B feet.   Went to see her PCP- antibiotic tx. Persistent pain tops of feet B. Was unable to wear her CMOs that she got earlier last year. Initially the orhotics felt good, however after tic bite it hurt more to wear them.  Redness top of her feet B x 1 week. Pain has persisted. Still wearing orthotics but don't seem to help like they were before the tic bite.   Attempted to f/u with DPM who gave her the orthotics; they moved out of area.   Got an appt with Dr. Gomes in Feb; referred for CMOs. Presents today for evaluation for CMOs. Notes she has been doing PT exercises from the DPM and is not interested in formal PT at this time. Notes she did trial dancers pads from [facility] and notes these did not help \"at all\" and made her feet hurt more.   Xrays done B feet; + foot arthitis in feet and ankles B per pt.   Given injections both feet in March which did help per pt. Overall, she still has pain, but not as much as before injections.    Pain 1* under 2nd and 3rd MTP but closer to the toes. Pain was really bad across tops of METs 2-5 B as well; not as bad now. Occasional pain in arches, ankles, heels B; better since injections .  Pain with standing/walking; sometimes with stretching foot back as well.   No pain " NWB/sitting/laying.   NO n/t R/L foot or leg.   Walks 2-3 miles 6 days/week. Not as much in the winter; more now that is it warmer. Sometimes walks extra daily when it is nice out. Pt is a teacher; on her feet all day.   Wearing melchor [employer] 7.5 Wide at present.     OBJECTIVE:    Age: 64 y.o.  Weight: 130#    Foot Posture Index Score    Right Foot  Talar dome - +1  Malleolar curve - +1  Calcaneal eversion - +1  Talonavicular bulge - +1  Medial longitudinal arch - +1  Too many toes - +2  Total Score R = +7    Left Foot  Talar dome - +1  Malleolar curve - +1   Calcaneal eversion - +1  Talonavicular bulge - +1  Medial longitudinal arch - +1  Too many toes - +1  Total Score L = +6    Reference Values  Normal =    0 to +5  Pronated =  +6 to +9 Highly Pronated =  10+  Supinated =   -1 to -4 Highly Supinated = -5 to -12      Objective      Gait Assessment:  Right Stance Phase- RF neutral at IC; mild early and increased RF valgus into loading response; increased toe out, RF valgus, FF pronation into mid- late stance; remains in toe out/pronation at push off; early heel rise and medial heel whip terminal stance   Left Stance Phase -  RF neutral at IC; early and increased RF valgus into loading response; increased toe out, RF valgus, FF pronation into mid- late stance; remains in toe out/pronation at push off; early heel rise and medial heel whip terminal stance     AROM/PROM:             MMT          AROM          PROM    Ankle         R          L          R         L          R         L   PF   WNL WNL     DF.   5-7* 3-5*     DF bent knee         EHL         Inv.   WNL WNL     Ever.   WNL WNL     Great toe Extension            Functional Ankle Dorsiflexion ROM:  NT    Palpation:  TTP plantar aspect 2-4 MET heads proximal to proximal phalanx and greatest ttp at 2nd toe just below proximal phalanx - mild to mod on L and mild on R; mild ttp 2-4 met heads L > R foot with increased prominence of met heads B  Ttp R/L  medial calcaneal tubercle B/medial heel B plantar surface  Ttp lateral talar head L > R     Observation:  Moderate HAV and bunion 1st MTP B; mild redness over bunion B  Hammer toes B 2-5 (mostly 2-4)  Increased callusing medial great toe (pinch callus) > medial 1st MTP B   Mild callusing under 2nd MET head L > R   Mild redness 5th digit B     Neurological Testing:  Intact light touch R/L foot/ankle     Joint Mobility:        Right                         Left     Subtalar-                  WNL                               WNL  Midfoot-                   WNL                               WNL     Forefoot-                 WNL                               WNL  Talocrural-              hypo                               hypo  First Ray-                WNL                               WNL    Subtalar Neutral Assessment:  Right- see scans; FF varus    Left- See scan; FF varus       ASSESSMENT:    Patient requires custom foot orthosis with B deepened heel cup, B medial longitudinal arch support, B medial RF and FF posting L > R, and possible MET and heel pads B to improve CKC foot/ankle alignment, offload painful structures, and to correct altered gait mechanics contributing to pain/sx and functional limitations. Patient is not currently controlled by her motion-controlled shoe or prior pair of CMOs. Foot/ankle exam and Ipad scans were completed this date as noted above. Will consult with orthotist regarding patient presentation and scans. Pt will return to PT for dispensing of orthotics when they arrive. Will review orthotic fit to shoe and pt's foot, wearing schedule, gait assessment with orthotics etc at that time. Pt in agreement with plan with no questions/concerns end of session. Thank you for your referral.     Therapeutic activity 4/15: Pt education regarding specific shoe recommendations for her foot type, foot pain, and HAV. Education provided in what makes up a good supportive shoe and how it will help her  feet. Pt education regarding use of toe spacers and wide toe box shoes to help with her HAV and bunion and 5th digit redness B. Pt education in role of calf/heel cord tightness in relation to her gait deviations and pain/sx and how stretching can help. etc      Orthotic goals:  1) Patient will have custom foot orthoses fitted to her shoe.   2) Patient will be compliant with break-in period of custom foot orthoses as prescribed by PT.   3) Patient will be compliant with custom foot orthoses use as prescribed by PT.     Plan:    Planned therapy interventions: orthotic fitting/training  Duration in visits: 2

## 2024-05-06 ENCOUNTER — OFFICE VISIT (OUTPATIENT)
Dept: PHYSICAL THERAPY | Facility: CLINIC | Age: 64
End: 2024-05-06
Payer: COMMERCIAL

## 2024-05-06 DIAGNOSIS — M77.41 METATARSALGIA OF BOTH FEET: ICD-10-CM

## 2024-05-06 DIAGNOSIS — M79.672 PAIN IN BOTH FEET: Primary | ICD-10-CM

## 2024-05-06 DIAGNOSIS — M19.079 ARTHRITIS OF FOOT: ICD-10-CM

## 2024-05-06 DIAGNOSIS — M77.42 METATARSALGIA OF BOTH FEET: ICD-10-CM

## 2024-05-06 DIAGNOSIS — M79.671 PAIN IN BOTH FEET: Primary | ICD-10-CM

## 2024-05-06 PROCEDURE — L3010 FOOT LONGITUDINAL ARCH SUPPO: HCPCS | Performed by: PHYSICAL MEDICINE & REHABILITATION

## 2024-05-06 NOTE — PROGRESS NOTES
"Daily Note     Today's date: 2024  Patient name: Chidi Peterson  : 1960  MRN: [id number]  Referring provider: Karen Gomes DPM  Dx:   Encounter Diagnosis     ICD-10-CM    1. Pain in both feet  M79.671     M79.672       2. Arthritis of foot  M19.079       3. Metatarsalgia of both feet  M77.41     M77.42                      Subjective: Pt with no new sx/complaints. Hoping the orthotics help because her feet have been hurting.       Objective: See treatment diary below      Assessment: Custom orthotics fitted to patients feet in seated and standing; accurate fit/alignment/ arch support noted B. Noted good support of feet/arches as well in standing on orthotics with no abnormal positioning or alignment observed. Inlay was removed from R/L shoe and pt was instructed how to remove inlays from other shoes/sneakers at home as well. Custom orthotics were fit to pt's shoes and accurate fit was noted. Pt noted no unusual pain/sx in standing with orthotics in shoes. PT notes 2* pt's bunions and HAV ,pt should have wider toe box show; showed and reviewed this with pt; understanding noted; she notes she is taking CMOs to [employer] to get new sneakers. Pt's gait was assessed with orthotics. Improved hindfoot and midfoot support and control were noted t/o R and L stance phases without unusual pain/sx. Pt noted comfort in orthotics. Pt reported her foot pain was \"better\" with the orthotics and that they were \"really comfortable\". Reviewed and issued weaning/break in protocol with pt; instructed pt to stop wearing orthotics and contact PT if any unusual pain, increased pain/sx, redness/blisters/hot spots etc should arise; also instructed pt to call PT with any questions/concerns regarding orthotics; understanding noted/reported with no questions/concerns after session. Patient will follow up if any future problems arise.         Plan: Pt to wean into orthotics as discussed today; handout given/reviewed. Pt to " contact PT if any questions, concerns, or needs arise regarding the orthotics.

## 2024-06-17 ENCOUNTER — OFFICE VISIT (OUTPATIENT)
Dept: PODIATRY | Age: 64
End: 2024-06-17
Payer: COMMERCIAL

## 2024-06-17 VITALS
SYSTOLIC BLOOD PRESSURE: 118 MMHG | HEART RATE: 77 BPM | WEIGHT: 131 LBS | TEMPERATURE: 98.6 F | OXYGEN SATURATION: 99 % | HEIGHT: 64 IN | DIASTOLIC BLOOD PRESSURE: 80 MMHG | BODY MASS INDEX: 22.36 KG/M2

## 2024-06-17 DIAGNOSIS — M79.672 PAIN IN BOTH FEET: Primary | ICD-10-CM

## 2024-06-17 DIAGNOSIS — M79.671 PAIN IN BOTH FEET: Primary | ICD-10-CM

## 2024-06-17 DIAGNOSIS — M19.079 ARTHRITIS OF FOOT: ICD-10-CM

## 2024-06-17 PROCEDURE — 99213 OFFICE O/P EST LOW 20 MIN: CPT | Performed by: STUDENT IN AN ORGANIZED HEALTH CARE EDUCATION/TRAINING PROGRAM

## 2024-06-17 RX ORDER — FEXOFENADINE HCL AND PSEUDOEPHEDRINE HCI 60; 120 MG/1; MG/1
1 TABLET, EXTENDED RELEASE ORAL 2 TIMES DAILY
COMMUNITY

## 2024-06-17 RX ORDER — METHYLPREDNISOLONE 4 MG/1
4 TABLET ORAL
COMMUNITY
Start: 2024-06-12

## 2024-06-17 NOTE — PROGRESS NOTES
Assessment/Plan:     Diagnoses and all orders for this visit:    Pain in both feet  -     FL guided needle plac bx/asp/inj; Future  -     FL guided needle plac bx/asp/inj; Future    Arthritis of foot  -     FL guided needle plac bx/asp/inj; Future  -     FL guided needle plac bx/asp/inj; Future    Other orders  -     methylPREDNISolone 4 MG tablet therapy pack; Take 4 mg by mouth Follow package directions (Patient not taking: Reported on 6/17/2024)  -     fexofenadine-pseudoephedrine (ALLEGRA-D)  MG per tablet; Take 1 tablet by mouth 2 (two) times a day              Prior Imaging  XR right foot WB 3v 1/29/24: HAV with ALLIE 16-17, sesamoid position 5-6, mild 1st MTPJ arthritic changes, shortening 1st met. 2nd tarsometatarsal joint arthritic changes. No acute osseous abnormalities noted.   XR left foot WB 3v 1/29/24: HAV with ALLIE 16-17, sesamoid position 5-6, mild 1st MTPJ arthritic changes. Midfoot arthritic changes. 2nd tarsometatarsal joint arthritic changes. No acute osseous abnormalities noted.       IMPRESSION:  B/L foot pain. Differential diagnosis include metatarsalgia (retrograde pressure from Hts, equinus), neuroma, 2nd TMTJ arthritis, other degenerative changes (calcaneal spurs, arthrosis of the joints of the foot), and inflammatory conditions of the ligaments and fascia of the foot and ankle.   HAV     PLAN:  B/L foot pain doing well with conservative care. CMOs feel great.  S/p B/L 2nd TMTJ FL-guided injections 3/1/24. I ordered these again as she got good relief at dorsal TMTJs until last week.   C/w topical pain creams PRN, achilles tendon stretching, stiff bottomed sneakers/shoes (ex Asics, Vionic, New balance, [place], etc) for daily use and Rona Martinez (recovery slides) for in home use.   PT note from orthotic fitting reviewed 5/6/24  I again discussed gabapentin, pain mngmt, rheumatology consult however pt defers  F/u prn      Subjective:      Patient ID: Chidi Peterson is a 64 y.o.  "female.    Chidi presents to clinic today concerning B/L foot pain. Injections did help L>R midfoot but that pain did come back about one week ago. Notes  soreness to bottom of forefoot has improved significantly with CMOs.     \"Has h/o neuromas with injections which helps. Uses CMOs 20yrs, last got new pair last year.   Bit by a tick in November and was given Doxy - notes redness to tops of feet - stopped abx as could be reaction. Notes foot pain to joints have been progressively worse.    Has seen rheumatologist in past and told she had OA.   Pain to tops of midfoot and bottoms of midfoot/MTPJs, right ankle anterior and back of right calf.\"         The following portions of the patient's history were reviewed and updated as appropriate: allergies, current medications, past family history, past medical history, past social history, past surgical history, and problem list.    Review of Systems   Constitutional:  Negative for activity change, chills and fever.   HENT: Negative.     Respiratory:  Negative for cough, chest tightness and shortness of breath.    Cardiovascular:  Negative for chest pain and leg swelling.   Endocrine: Negative.    Genitourinary: Negative.    Musculoskeletal:  Positive for gait problem (B/L foot pain).   Neurological:  Negative for numbness.   Psychiatric/Behavioral: Negative.  Negative for agitation and behavioral problems.          Objective:      /80   Pulse 77   Temp 98.6 °F (37 °C) (Temporal)   Ht 5' 3.5\" (1.613 m)   Wt 59.4 kg (131 lb)   SpO2 99%   BMI 22.84 kg/m²          Physical Exam  Constitutional:       Appearance: Normal appearance. She is not ill-appearing.   Cardiovascular:      Pulses: Normal pulses.      Comments: Bilateral DP & PT pulses 2/4. CRT WNL. Pedal hair present. Feet warm and well perfused.   Pulmonary:      Effort: Pulmonary effort is normal. No respiratory distress.   Musculoskeletal:         General: Tenderness (B/L feet along 2-4 distal " metatarsals now only dorsal met heads, scant. B/L dorsal 2nd/3rd TMTJ/midfoot region) and deformity (B/L HAV with HTs, prominent metatarsal heads with fat pad atrophy) present. Normal range of motion.      Comments: There is decreased ankle DF with knee extended and flexed indicating gastroc-soleal equinus.   Mild pain with ROM 2nd.3rd TMTJs, R>L.  Bilateral ankle, STJ, TNJ, TMTJ, MTPJ ROM WNL and without pain. LE muscle strength WNL.   Skin:     General: Skin is warm.      Capillary Refill: Capillary refill takes less than 2 seconds.      Findings: No erythema or lesion.   Neurological:      General: No focal deficit present.      Mental Status: She is alert and oriented to person, place, and time.      Sensory: No sensory deficit.      Comments: Gross sensation intact. Denies N/T/B to B/L feet.    Psychiatric:         Mood and Affect: Mood normal.

## 2024-06-17 NOTE — PATIENT INSTRUCTIONS
Purchase over the counter topical pain creams such as Voltarin gel, biofreeze, or CBD cream - will need to apply 2-3 times per day for benefit.

## 2024-06-19 ENCOUNTER — VBI (OUTPATIENT)
Dept: ADMINISTRATIVE | Facility: OTHER | Age: 64
End: 2024-06-19

## 2024-06-19 NOTE — TELEPHONE ENCOUNTER
06/19/24 3:00 PM     Chart reviewed for [facility]: Colonoscopy ; nothing is submitted to the patient's insurance at this time.     Kailey Quick   [facility]

## 2024-07-22 ENCOUNTER — TELEPHONE (OUTPATIENT)
Dept: RADIOLOGY | Facility: HOSPITAL | Age: 64
End: 2024-07-22

## 2024-07-22 NOTE — NURSING NOTE
Call placed to pt to discuss upcoming appointment at [facility] Radiology Department.  No answer.  Information left on pts voicemail.  Pt is having BL Foot CSIs completed on 7/24/2024.  Pre procedure instructions given including diet and taking own medications.  Instructed pt that she may eat normally and take medications as usual before the procedure.  Instructed pt that she will require a .  Reminded pt of the location, date and time of procedure.  Phone number given for pt to call with any questions.

## 2024-07-24 ENCOUNTER — HOSPITAL ENCOUNTER (OUTPATIENT)
Dept: RADIOLOGY | Facility: HOSPITAL | Age: 64
Discharge: HOME | End: 2024-07-24
Attending: STUDENT IN AN ORGANIZED HEALTH CARE EDUCATION/TRAINING PROGRAM
Payer: COMMERCIAL

## 2024-07-24 DIAGNOSIS — M19.079 ARTHRITIS OF FOOT: ICD-10-CM

## 2024-07-24 DIAGNOSIS — M79.672 PAIN IN BOTH FEET: ICD-10-CM

## 2024-07-24 DIAGNOSIS — M79.671 PAIN IN BOTH FEET: ICD-10-CM

## 2024-07-24 PROCEDURE — 77002 NEEDLE LOCALIZATION BY XRAY: CPT

## 2024-07-24 PROCEDURE — 20605 DRAIN/INJ JOINT/BURSA W/O US: CPT

## 2024-07-24 RX ORDER — ROPIVACAINE HYDROCHLORIDE 2 MG/ML
1 INJECTION, SOLUTION EPIDURAL; INFILTRATION; PERINEURAL ONCE
Status: DISCONTINUED | OUTPATIENT
Start: 2024-07-24 | End: 2024-07-25 | Stop reason: HOSPADM

## 2024-07-24 RX ORDER — METHYLPREDNISOLONE ACETATE 80 MG/ML
40 INJECTION, SUSPENSION INTRA-ARTICULAR; INTRALESIONAL; INTRAMUSCULAR; SOFT TISSUE
Status: DISCONTINUED | OUTPATIENT
Start: 2024-07-24 | End: 2024-07-25 | Stop reason: HOSPADM

## 2024-07-24 RX ORDER — ROPIVACAINE HYDROCHLORIDE 2 MG/ML
2 INJECTION, SOLUTION EPIDURAL; INFILTRATION; PERINEURAL ONCE
Status: DISCONTINUED | OUTPATIENT
Start: 2024-07-24 | End: 2024-07-25 | Stop reason: HOSPADM

## 2024-07-24 RX ADMIN — IOHEXOL 1 ML: 300 INJECTION, SOLUTION INTRAVENOUS at 11:44

## 2024-07-24 RX ADMIN — IOHEXOL 1 ML: 300 INJECTION, SOLUTION INTRAVENOUS at 11:41

## 2024-07-26 ENCOUNTER — HOSPITAL ENCOUNTER (OUTPATIENT)
Dept: MAMMOGRAPHY | Facility: HOSPITAL | Age: 64
Discharge: HOME | End: 2024-07-26
Payer: COMMERCIAL

## 2024-07-26 VITALS — BODY MASS INDEX: 22.36 KG/M2 | HEIGHT: 64 IN | WEIGHT: 131 LBS

## 2024-07-26 DIAGNOSIS — Z12.31 ENCOUNTER FOR SCREENING MAMMOGRAM FOR MALIGNANT NEOPLASM OF BREAST: ICD-10-CM

## 2024-07-26 PROCEDURE — 77063 BREAST TOMOSYNTHESIS BI: CPT

## 2024-07-26 PROCEDURE — 77067 SCR MAMMO BI INCL CAD: CPT

## 2024-08-19 ENCOUNTER — RA CDI HCC (OUTPATIENT)
Dept: OTHER | Facility: HOSPITAL | Age: 64
End: 2024-08-19

## 2024-08-19 PROBLEM — R22.9 MASS OF SUBCUTANEOUS TISSUE: Status: RESOLVED | Noted: 2020-07-07 | Resolved: 2024-08-19

## 2024-08-21 ENCOUNTER — APPOINTMENT (OUTPATIENT)
Dept: LAB | Facility: CLINIC | Age: 64
End: 2024-08-21
Payer: COMMERCIAL

## 2024-08-21 DIAGNOSIS — Z13.220 SCREENING FOR CHOLESTEROL LEVEL: ICD-10-CM

## 2024-08-21 DIAGNOSIS — E55.9 VITAMIN D DEFICIENCY: ICD-10-CM

## 2024-08-21 DIAGNOSIS — J45.909 ASTHMA, UNSPECIFIED ASTHMA SEVERITY, UNSPECIFIED WHETHER COMPLICATED, UNSPECIFIED WHETHER PERSISTENT: ICD-10-CM

## 2024-08-21 DIAGNOSIS — M25.50 POLYARTHRALGIA: ICD-10-CM

## 2024-08-21 LAB
25(OH)D3 SERPL-MCNC: 30.2 NG/ML (ref 30–100)
ALBUMIN SERPL BCG-MCNC: 4.3 G/DL (ref 3.5–5)
ALP SERPL-CCNC: 35 U/L (ref 34–104)
ALT SERPL W P-5'-P-CCNC: 15 U/L (ref 7–52)
ANION GAP SERPL CALCULATED.3IONS-SCNC: 9 MMOL/L (ref 4–13)
AST SERPL W P-5'-P-CCNC: 20 U/L (ref 13–39)
BASOPHILS # BLD AUTO: 0.09 THOUSANDS/ÂΜL (ref 0–0.1)
BASOPHILS NFR BLD AUTO: 2 % (ref 0–1)
BILIRUB SERPL-MCNC: 0.7 MG/DL (ref 0.2–1)
BUN SERPL-MCNC: 11 MG/DL (ref 5–25)
CALCIUM SERPL-MCNC: 9.2 MG/DL (ref 8.4–10.2)
CHLORIDE SERPL-SCNC: 106 MMOL/L (ref 96–108)
CHOLEST SERPL-MCNC: 199 MG/DL
CO2 SERPL-SCNC: 28 MMOL/L (ref 21–32)
CREAT SERPL-MCNC: 0.79 MG/DL (ref 0.6–1.3)
EOSINOPHIL # BLD AUTO: 0.28 THOUSAND/ÂΜL (ref 0–0.61)
EOSINOPHIL NFR BLD AUTO: 5 % (ref 0–6)
ERYTHROCYTE [DISTWIDTH] IN BLOOD BY AUTOMATED COUNT: 12.1 % (ref 11.6–15.1)
GFR SERPL CREATININE-BSD FRML MDRD: 79 ML/MIN/1.73SQ M
GLUCOSE P FAST SERPL-MCNC: 92 MG/DL (ref 65–99)
HCT VFR BLD AUTO: 40.3 % (ref 34.8–46.1)
HDLC SERPL-MCNC: 68 MG/DL
HGB BLD-MCNC: 12.7 G/DL (ref 11.5–15.4)
IMM GRANULOCYTES # BLD AUTO: 0.01 THOUSAND/UL (ref 0–0.2)
IMM GRANULOCYTES NFR BLD AUTO: 0 % (ref 0–2)
LDLC SERPL CALC-MCNC: 105 MG/DL (ref 0–100)
LYMPHOCYTES # BLD AUTO: 1.57 THOUSANDS/ÂΜL (ref 0.6–4.47)
LYMPHOCYTES NFR BLD AUTO: 30 % (ref 14–44)
MCH RBC QN AUTO: 29.1 PG (ref 26.8–34.3)
MCHC RBC AUTO-ENTMCNC: 31.5 G/DL (ref 31.4–37.4)
MCV RBC AUTO: 92 FL (ref 82–98)
MONOCYTES # BLD AUTO: 0.48 THOUSAND/ÂΜL (ref 0.17–1.22)
MONOCYTES NFR BLD AUTO: 9 % (ref 4–12)
NEUTROPHILS # BLD AUTO: 2.77 THOUSANDS/ÂΜL (ref 1.85–7.62)
NEUTS SEG NFR BLD AUTO: 54 % (ref 43–75)
NRBC BLD AUTO-RTO: 0 /100 WBCS
PLATELET # BLD AUTO: 407 THOUSANDS/UL (ref 149–390)
PMV BLD AUTO: 10.3 FL (ref 8.9–12.7)
POTASSIUM SERPL-SCNC: 3.8 MMOL/L (ref 3.5–5.3)
PROT SERPL-MCNC: 7 G/DL (ref 6.4–8.4)
RBC # BLD AUTO: 4.36 MILLION/UL (ref 3.81–5.12)
SODIUM SERPL-SCNC: 143 MMOL/L (ref 135–147)
TRIGL SERPL-MCNC: 130 MG/DL
WBC # BLD AUTO: 5.2 THOUSAND/UL (ref 4.31–10.16)

## 2024-08-21 PROCEDURE — 80053 COMPREHEN METABOLIC PANEL: CPT

## 2024-08-21 PROCEDURE — 85025 COMPLETE CBC W/AUTO DIFF WBC: CPT

## 2024-08-21 PROCEDURE — 82306 VITAMIN D 25 HYDROXY: CPT

## 2024-08-21 PROCEDURE — 80061 LIPID PANEL: CPT

## 2024-08-21 PROCEDURE — 36415 COLL VENOUS BLD VENIPUNCTURE: CPT

## 2024-08-26 ENCOUNTER — OFFICE VISIT (OUTPATIENT)
Dept: FAMILY MEDICINE CLINIC | Facility: CLINIC | Age: 64
End: 2024-08-26
Payer: COMMERCIAL

## 2024-08-26 VITALS
DIASTOLIC BLOOD PRESSURE: 84 MMHG | BODY MASS INDEX: 22.02 KG/M2 | RESPIRATION RATE: 20 BRPM | SYSTOLIC BLOOD PRESSURE: 126 MMHG | WEIGHT: 129 LBS | TEMPERATURE: 98.1 F | HEIGHT: 64 IN | HEART RATE: 84 BPM

## 2024-08-26 DIAGNOSIS — Z53.20 COLON CANCER SCREENING DECLINED: ICD-10-CM

## 2024-08-26 DIAGNOSIS — S00.462D TICK BITE OF LEFT EAR, SUBSEQUENT ENCOUNTER: ICD-10-CM
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
## 2024-08-26 DIAGNOSIS — Z00.00 ANNUAL PHYSICAL EXAM: Primary | ICD-10-CM

## 2024-08-26 DIAGNOSIS — M25.50 POLYARTHRALGIA: ICD-10-CM

## 2024-08-26 PROCEDURE — 99396 PREV VISIT EST AGE 40-64: CPT | Performed by: FAMILY MEDICINE

## 2024-08-26 PROCEDURE — 99213 OFFICE O/P EST LOW 20 MIN: CPT | Performed by: FAMILY MEDICINE

## 2024-08-26 NOTE — PROGRESS NOTES
Adult Annual Physical  Name: Chidi Peterson      : 1960      MRN: [id number]  Encounter Provider: Skyler Lezama DO  Encounter Date: 2024   Encounter department: [facility]    Assessment & Plan history of a tick bite and polyarthralgia we will obtain a Lyme titer C-reactive protein and rheumatoid factor    The patient defers colon cancer screening  1. Annual physical exam  2. Polyarthralgia  -     CBC and differential; Future  -     C-reactive protein; Future  -     Rheumatoid Factor; Future  -     Lyme disease, PCR; Future  3. Tick bite of left ear, subsequent encounter  -     CBC and differential; Future  -     Lyme disease, PCR; Future  Immunizations and preventive care screenings were discussed with patient today. Appropriate education was printed on patient's after visit summary.    Counseling:  Alcohol/drug use: discussed moderation in alcohol intake, the recommendations for healthy alcohol use, and avoidance of illicit drug use.  Dental Health: discussed importance of regular tooth brushing, flossing, and dental visits.  Injury prevention: discussed safety/seat belts, safety helmets, smoke detectors, carbon dioxide detectors, and smoking near bedding or upholstery.  Sexual health: discussed sexually transmitted diseases, partner selection, use of condoms, avoidance of unintended pregnancy, and contraceptive alternatives.  Exercise: the importance of regular exercise/physical activity was discussed. Recommend exercise 3-5 times per week for at least 30 minutes.       Depression Screening and Follow-up Plan: Patient was screened for depression during today's encounter. They screened negative with a PHQ-2 score of 0.        History of Present Illness     Adult Annual Physical:  Patient presents for annual physical.     Diet and Physical Activity:  - Diet/Nutrition: well balanced diet.  - Exercise: walking, strength training exercises, 5-7 times a week on average  "and 30-60 minutes on average.    Depression Screening:  - PHQ-2 Score: 0    General Health:  - Sleep: sleeps well and 4-6 hours of sleep on average.  - Hearing: normal hearing bilateral ears.  - Vision: no vision problems.  - Dental: regular dental visits and brushes teeth twice daily.    /GYN Health:  - Follows with GYN: yes.   - Last menstrual cycle: 8/7/2019.   - History of STDs: no  - Contraception: menopause.      Advanced Care Planning:  - Has an advanced directive?: no    - Has a durable medical POA?: no    - ACP document given to patient?: no    Subjective the patient denies headache eye pain blurry vision double vision difficulty hearing problems with her sense of smell or taste.    Patient denies trouble swallowing or painful swallowing    Patient denies cough wheezing shortness of breath    Patient denies chest pain tightness heaviness    Patient denies nausea vomiting constipation or diarrhea denies any change in her bowel habits    Patient denies urinary difficulty    Patient denies numbness tingling weakness    Patient does complain of bilateral bunion pain      Objective     /84   Pulse 84   Temp 98.1 °F (36.7 °C)   Resp 20   Ht 5' 3.5\" (1.613 m)   Wt 58.5 kg (129 lb)   BMI 22.49 kg/m²     Physical Exam  Vitals and nursing note reviewed.   Constitutional:       General: She is not in acute distress.     Appearance: She is well-developed.   HENT:      Head: Normocephalic and atraumatic.      Right Ear: Tympanic membrane, ear canal and external ear normal.      Left Ear: Tympanic membrane, ear canal and external ear normal.      Nose: Nose normal.      Mouth/Throat:      Mouth: Mucous membranes are moist.      Pharynx: Oropharynx is clear.   Eyes:      Extraocular Movements: Extraocular movements intact.      Conjunctiva/sclera: Conjunctivae normal.      Pupils: Pupils are equal, round, and reactive to light.   Cardiovascular:      Rate and Rhythm: Normal rate and regular rhythm.      " Heart sounds: No murmur heard.  Pulmonary:      Effort: Pulmonary effort is normal. No respiratory distress.      Breath sounds: Normal breath sounds.   Abdominal:      General: Abdomen is flat. Bowel sounds are normal.      Palpations: Abdomen is soft.      Tenderness: There is no abdominal tenderness.   Musculoskeletal:         General: No swelling. Normal range of motion.      Cervical back: Normal range of motion and neck supple.   Skin:     General: Skin is warm and dry.      Capillary Refill: Capillary refill takes less than 2 seconds.   Neurological:      General: No focal deficit present.      Mental Status: She is alert. Mental status is at baseline. She is disoriented.   Psychiatric:         Mood and Affect: Mood normal.

## 2024-08-26 NOTE — PATIENT INSTRUCTIONS
"Patient Education     Routine physical for adults   The Basics   Written by the doctors and editors at [facility]   What is a physical? -- A physical is a routine visit, or \"check-up,\" with your doctor. You might also hear it called a \"wellness visit\" or \"preventive visit.\"  During each visit, the doctor will:   Ask about your physical and mental health   Ask about your habits, behaviors, and lifestyle   Do an exam   Give you vaccines if needed   Talk to you about any medicines you take   Give advice about your health   Answer your questions  Getting regular check-ups is an important part of taking care of your health. It can help your doctor find and treat any problems you have. But it's also important for preventing health problems.  A routine physical is different from a \"sick visit.\" A sick visit is when you see a doctor because of a health concern or problem. Since physicals are scheduled ahead of time, you can think about what you want to ask the doctor.  How often should I get a physical? -- It depends on your age and health. In general, for people age 21 years and older:   If you are younger than 50 years, you might be able to get a physical every 3 years.   If you are 50 years or older, your doctor might recommend a physical every year.  If you have an ongoing health condition, like diabetes or high blood pressure, your doctor will probably want to see you more often.  What happens during a physical? -- In general, each visit will include:   Physical exam - The doctor or nurse will check your height, weight, heart rate, and blood pressure. They will also look at your eyes and ears. They will ask about how you are feeling and whether you have any symptoms that bother you.   Medicines - It's a good idea to bring a list of all the medicines you take to each doctor visit. Your doctor will talk to you about your medicines and answer any questions. Tell them if you are having any side effects that bother you. You " "should also tell them if you are having trouble paying for any of your medicines.   Habits and behaviors - This includes:   Your diet   Your exercise habits   Whether you smoke, drink alcohol, or use drugs   Whether you are sexually active   Whether you feel safe at home  Your doctor will talk to you about things you can do to improve your health and lower your risk of health problems. They will also offer help and support. For example, if you want to quit smoking, they can give you advice and might prescribe medicines. If you want to improve your diet or get more physical activity, they can help you with this, too.   Lab tests, if needed - The tests you get will depend on your age and situation. For example, your doctor might want to check your:   Cholesterol   Blood sugar   Iron level   Vaccines - The recommended vaccines will depend on your age, health, and what vaccines you already had. Vaccines are very important because they can prevent certain serious or deadly infections.   Discussion of screening - \"Screening\" means checking for diseases or other health problems before they cause symptoms. Your doctor can recommend screening based on your age, risk, and preferences. This might include tests to check for:   Cancer, such as breast, prostate, cervical, ovarian, colorectal, prostate, lung, or skin cancer   Sexually transmitted infections, such as chlamydia and gonorrhea   Mental health conditions like depression and anxiety  Your doctor will talk to you about the different types of screening tests. They can help you decide which screenings to have. They can also explain what the results might mean.   Answering questions - The physical is a good time to ask the doctor or nurse questions about your health. If needed, they can refer you to other doctors or specialists, too.  Adults older than 65 years often need other care, too. As you get older, your doctor will talk to you about:   How to prevent falling at " home   Hearing or vision tests   Memory testing   How to take your medicines safely   Making sure that you have the help and support you need at home  All topics are updated as new evidence becomes available and our peer review process is complete.  This topic retrieved from [employer] on: May 02, 2024.  Topic 131570 Version 1.0  Release: 32.4.3 - C32.122  © 2024 UpToDate, Inc. and/or its affiliates. All rights reserved.  Consumer Information Use and Disclaimer   Disclaimer: This generalized information is a limited summary of diagnosis, treatment, and/or medication information. It is not meant to be comprehensive and should be used as a tool to help the user understand and/or assess potential diagnostic and treatment options. It does NOT include all information about conditions, treatments, medications, side effects, or risks that may apply to a specific patient. It is not intended to be medical advice or a substitute for the medical advice, diagnosis, or treatment of a health care provider based on the health care provider's examination and assessment of a patient's specific and unique circumstances. Patients must speak with a health care provider for complete information about their health, medical questions, and treatment options, including any risks or benefits regarding use of medications. This information does not endorse any treatments or medications as safe, effective, or approved for treating a specific patient. UpToDate, Inc. and its affiliates disclaim any warranty or liability relating to this information or the use thereof.The use of this information is governed by the Terms of Use, available at https://www.woltersMusic Nationuwer.com/en/know/clinical-effectiveness-terms. 2024© UpToDate, Inc. and its affiliates and/or licensors. All rights reserved.  Copyright   © 2024 UpToDate, Inc. and/or its affiliates. All rights reserved.

## 2024-08-30 ENCOUNTER — APPOINTMENT (OUTPATIENT)
Dept: LAB | Facility: CLINIC | Age: 64
End: 2024-08-30
Payer: COMMERCIAL

## 2024-08-30 DIAGNOSIS — M25.50 POLYARTHRALGIA: ICD-10-CM
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
## 2024-08-30 DIAGNOSIS — S00.462D TICK BITE OF LEFT EAR, SUBSEQUENT ENCOUNTER: ICD-10-CM

## 2024-08-30 LAB
B BURGDOR IGG+IGM SER QL IA: NEGATIVE
BASOPHILS # BLD AUTO: 0.15 THOUSANDS/ÂΜL (ref 0–0.1)
BASOPHILS NFR BLD AUTO: 2 % (ref 0–1)
CRP SERPL QL: 1.3 MG/L
EOSINOPHIL # BLD AUTO: 0.54 THOUSAND/ÂΜL (ref 0–0.61)
EOSINOPHIL NFR BLD AUTO: 8 % (ref 0–6)
ERYTHROCYTE [DISTWIDTH] IN BLOOD BY AUTOMATED COUNT: 12 % (ref 11.6–15.1)
HCT VFR BLD AUTO: 41.7 % (ref 34.8–46.1)
HGB BLD-MCNC: 13 G/DL (ref 11.5–15.4)
IMM GRANULOCYTES # BLD AUTO: 0.02 THOUSAND/UL (ref 0–0.2)
IMM GRANULOCYTES NFR BLD AUTO: 0 % (ref 0–2)
LYMPHOCYTES # BLD AUTO: 1.88 THOUSANDS/ÂΜL (ref 0.6–4.47)
LYMPHOCYTES NFR BLD AUTO: 29 % (ref 14–44)
MCH RBC QN AUTO: 29.2 PG (ref 26.8–34.3)
MCHC RBC AUTO-ENTMCNC: 31.2 G/DL (ref 31.4–37.4)
MCV RBC AUTO: 94 FL (ref 82–98)
MONOCYTES # BLD AUTO: 0.46 THOUSAND/ÂΜL (ref 0.17–1.22)
MONOCYTES NFR BLD AUTO: 7 % (ref 4–12)
NEUTROPHILS # BLD AUTO: 3.39 THOUSANDS/ÂΜL (ref 1.85–7.62)
NEUTS SEG NFR BLD AUTO: 54 % (ref 43–75)
NRBC BLD AUTO-RTO: 0 /100 WBCS
PLATELET # BLD AUTO: 437 THOUSANDS/UL (ref 149–390)
PMV BLD AUTO: 10.3 FL (ref 8.9–12.7)
RBC # BLD AUTO: 4.45 MILLION/UL (ref 3.81–5.12)
WBC # BLD AUTO: 6.44 THOUSAND/UL (ref 4.31–10.16)

## 2024-08-30 PROCEDURE — 86140 C-REACTIVE PROTEIN: CPT

## 2024-08-30 PROCEDURE — 85025 COMPLETE CBC W/AUTO DIFF WBC: CPT

## 2024-08-30 PROCEDURE — 36415 COLL VENOUS BLD VENIPUNCTURE: CPT

## 2024-08-30 PROCEDURE — 86430 RHEUMATOID FACTOR TEST QUAL: CPT

## 2024-08-30 PROCEDURE — 86618 LYME DISEASE ANTIBODY: CPT

## 2024-09-02 LAB — RHEUMATOID FACT SER QL LA: NEGATIVE

## 2024-10-28 ENCOUNTER — TELEPHONE (OUTPATIENT)
Age: 64
End: 2024-10-28

## 2024-10-28 DIAGNOSIS — M19.071 ARTHRITIS OF BOTH FEET: ICD-10-CM

## 2024-10-28 DIAGNOSIS — M19.072 ARTHRITIS OF BOTH FEET: ICD-10-CM

## 2024-10-28 DIAGNOSIS — M79.672 PAIN IN BOTH FEET: Primary | ICD-10-CM

## 2024-10-28 DIAGNOSIS — M79.671 PAIN IN BOTH FEET: Primary | ICD-10-CM

## 2024-10-28 NOTE — TELEPHONE ENCOUNTER
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

## 2024-10-28 NOTE — TELEPHONE ENCOUNTER
Caller: Patient    Doctor: POD    Reason for call:     Patient transfer to POD, transfer    Call back#: n/a

## 2024-11-19 ENCOUNTER — TELEPHONE (OUTPATIENT)
Dept: NON INVASIVE DIAGNOSTICS | Facility: HOSPITAL | Age: 64
End: 2024-11-19

## 2024-11-19 NOTE — TELEPHONE ENCOUNTER
Call placed to patient to discuss upcoming bilateral foot corticosteroid injections at [facility] Radiology. Allergies reviewed and verified patient does not currently take any anticoagulant medications. Pre procedure instructions including diet, taking own medications and need for  discussed with patient. Patient instructed that she may eat normally and take medications as usual before the procedure. Procedure and post procedure expectations and instructions reviewed with the patient. Patient verbalizes understanding and denies any questions at this time. Reminded of the location, date and time of the expected procedure. Name and contact number provided in case patient has any further questions.

## 2024-11-25 ENCOUNTER — HOSPITAL ENCOUNTER (OUTPATIENT)
Dept: RADIOLOGY | Facility: HOSPITAL | Age: 64
Discharge: HOME | End: 2024-11-25
Attending: STUDENT IN AN ORGANIZED HEALTH CARE EDUCATION/TRAINING PROGRAM
Payer: COMMERCIAL

## 2024-11-25 DIAGNOSIS — M19.071 ARTHRITIS OF BOTH FEET: ICD-10-CM

## 2024-11-25 DIAGNOSIS — M79.671 PAIN IN BOTH FEET: ICD-10-CM

## 2024-11-25 DIAGNOSIS — M79.672 PAIN IN BOTH FEET: ICD-10-CM

## 2024-11-25 DIAGNOSIS — M19.072 ARTHRITIS OF BOTH FEET: ICD-10-CM

## 2024-11-25 PROCEDURE — 20605 DRAIN/INJ JOINT/BURSA W/O US: CPT

## 2024-11-25 PROCEDURE — 77002 NEEDLE LOCALIZATION BY XRAY: CPT

## 2024-11-25 RX ORDER — LIDOCAINE HYDROCHLORIDE 10 MG/ML
5 INJECTION, SOLUTION EPIDURAL; INFILTRATION; INTRACAUDAL; PERINEURAL
Status: COMPLETED | OUTPATIENT
Start: 2024-11-25 | End: 2024-11-25

## 2024-11-25 RX ORDER — BETAMETHASONE SODIUM PHOSPHATE AND BETAMETHASONE ACETATE 3; 3 MG/ML; MG/ML
6 INJECTION, SUSPENSION INTRA-ARTICULAR; INTRALESIONAL; INTRAMUSCULAR; SOFT TISSUE EVERY 24 HOURS
Status: COMPLETED | OUTPATIENT
Start: 2024-11-25 | End: 2024-11-25

## 2024-11-25 RX ORDER — ROPIVACAINE HYDROCHLORIDE 2 MG/ML
10 INJECTION, SOLUTION EPIDURAL; INFILTRATION; PERINEURAL ONCE
Status: COMPLETED | OUTPATIENT
Start: 2024-11-25 | End: 2024-11-25

## 2024-11-25 RX ADMIN — BETAMETHASONE SODIUM PHOSPHATE AND BETAMETHASONE ACETATE 6 MG: 3; 3 INJECTION, SUSPENSION INTRA-ARTICULAR; INTRALESIONAL; INTRAMUSCULAR at 12:52

## 2024-11-25 RX ADMIN — ROPIVACAINE HYDROCHLORIDE 1 ML: 2 INJECTION, SOLUTION EPIDURAL; INFILTRATION at 12:59

## 2024-11-25 RX ADMIN — ROPIVACAINE HYDROCHLORIDE 1 ML: 2 INJECTION, SOLUTION EPIDURAL; INFILTRATION at 12:52

## 2024-11-25 RX ADMIN — IOHEXOL 0.5 ML: 300 INJECTION, SOLUTION INTRAVENOUS at 12:59

## 2024-11-25 RX ADMIN — IOHEXOL 0.5 ML: 300 INJECTION, SOLUTION INTRAVENOUS at 12:52

## 2024-11-25 RX ADMIN — BETAMETHASONE SODIUM PHOSPHATE AND BETAMETHASONE ACETATE 1 MG: 3; 3 INJECTION, SUSPENSION INTRA-ARTICULAR; INTRALESIONAL; INTRAMUSCULAR at 12:59

## 2024-11-25 RX ADMIN — LIDOCAINE HYDROCHLORIDE 1 ML: 10 INJECTION, SOLUTION EPIDURAL; INFILTRATION; INTRACAUDAL at 12:59

## 2024-11-25 RX ADMIN — LIDOCAINE HYDROCHLORIDE 1 ML: 10 INJECTION, SOLUTION EPIDURAL; INFILTRATION; INTRACAUDAL at 12:52

## 2024-12-05 ENCOUNTER — VBI (OUTPATIENT)
Dept: ADMINISTRATIVE | Facility: OTHER | Age: 64
End: 2024-12-05

## 2024-12-05 NOTE — TELEPHONE ENCOUNTER
12/05/24 10:45 AM     Chart reviewed for CRC: Colonoscopy was/were not submitted to the patient's insurance.     Kailey Quick MA   [facility]

## 2025-03-03 ENCOUNTER — OFFICE VISIT (OUTPATIENT)
Dept: FAMILY MEDICINE CLINIC | Facility: CLINIC | Age: 65
End: 2025-03-03
Payer: COMMERCIAL

## 2025-03-03 VITALS
OXYGEN SATURATION: 98 % | WEIGHT: 134.2 LBS | BODY MASS INDEX: 26.35 KG/M2 | HEIGHT: 60 IN | HEART RATE: 88 BPM | TEMPERATURE: 97.6 F | DIASTOLIC BLOOD PRESSURE: 82 MMHG | SYSTOLIC BLOOD PRESSURE: 124 MMHG

## 2025-03-03 DIAGNOSIS — E55.9 VITAMIN D DEFICIENCY: ICD-10-CM

## 2025-03-03 DIAGNOSIS — M25.50 POLYARTHRALGIA: ICD-10-CM

## 2025-03-03 DIAGNOSIS — M79.671 PAIN IN BOTH FEET: Primary | ICD-10-CM

## 2025-03-03 DIAGNOSIS — E78.49 OTHER HYPERLIPIDEMIA: ICD-10-CM

## 2025-03-03 DIAGNOSIS — M79.672 PAIN IN BOTH FEET: Primary | ICD-10-CM

## 2025-03-03 PROCEDURE — 99213 OFFICE O/P EST LOW 20 MIN: CPT | Performed by: FAMILY MEDICINE

## 2025-03-03 NOTE — PROGRESS NOTES
"Name: Chidi Peterson      : 1960      MRN: [id number]  Encounter Provider: Skyler Lezama DO  Encounter Date: 3/3/2025   Encounter department: [facility]  :  Assessment & Plan  Pain in both feet         Polyarthralgia  Patient has pain in both feet C-reactive protein was negative x-ray showed degenerative changes.  The patient would like an evaluation by rheumatology  Orders:  •  Ambulatory Referral to Rheumatology; Future  •  CBC and differential; Future  •  Comprehensive metabolic panel; Future    Vitamin D deficiency    Orders:  •  Vitamin D 25 hydroxy; Future    Other hyperlipidemia    Orders:  •  Lipid Panel with Direct LDL reflex; Future          Depression Screening and Follow-up Plan: Patient was screened for depression during today's encounter. They screened negative with a PHQ-2 score of 0.        History of Present Illness   Patient presents to go over her laboratory and requesting referral to rheumatology for the ongoing pain in her feet      Review of Systems   Constitutional:  Negative for chills and fever.   HENT:  Negative for ear pain and sore throat.    Eyes:  Negative for pain and visual disturbance.   Respiratory:  Negative for cough and shortness of breath.    Cardiovascular:  Negative for chest pain and palpitations.   Gastrointestinal:  Negative for abdominal pain and vomiting.   Genitourinary:  Negative for dysuria and hematuria.   Musculoskeletal:  Positive for arthralgias. Negative for back pain.        Pain in both feet   Skin:  Negative for color change and rash.   Neurological:  Negative for seizures and syncope.   All other systems reviewed and are negative.      Objective   /82 (BP Location: Left arm, Patient Position: Sitting)   Pulse 88   Temp 97.6 °F (36.4 °C) (Tympanic)   Ht 5' 0.35\" (1.533 m)   Wt 60.9 kg (134 lb 3.2 oz)   SpO2 98%   BMI 25.91 kg/m²      Physical Exam  Constitutional:       Appearance: Normal appearance. "   HENT:      Head: Normocephalic and atraumatic.      Right Ear: Tympanic membrane, ear canal and external ear normal.      Left Ear: Tympanic membrane, ear canal and external ear normal.      Nose: Nose normal.      Mouth/Throat:      Mouth: Mucous membranes are moist.      Pharynx: Oropharynx is clear.   Eyes:      Extraocular Movements: Extraocular movements intact.      Conjunctiva/sclera: Conjunctivae normal.      Pupils: Pupils are equal, round, and reactive to light.   Cardiovascular:      Rate and Rhythm: Normal rate and regular rhythm.      Pulses: Normal pulses.      Heart sounds: Normal heart sounds.   Pulmonary:      Effort: Pulmonary effort is normal.      Breath sounds: Normal breath sounds.   Abdominal:      General: Abdomen is flat. Bowel sounds are normal.      Palpations: Abdomen is soft.   Musculoskeletal:         General: Normal range of motion.      Cervical back: Normal range of motion and neck supple.   Skin:     General: Skin is warm and dry.   Neurological:      General: No focal deficit present.      Mental Status: She is alert and oriented to person, place, and time.   Psychiatric:         Mood and Affect: Mood normal.         Behavior: Behavior normal.

## 2025-03-04 ENCOUNTER — TELEPHONE (OUTPATIENT)
Age: 65
End: 2025-03-04

## 2025-03-04 NOTE — TELEPHONE ENCOUNTER
Called patient left voicemail relaying message below. Advised patient to contact the office if she has a specific Rheumatologist she would like to have a referral sent to.

## 2025-03-04 NOTE — TELEPHONE ENCOUNTER
[facility] called stating Dr. Covington retired last summer and they currently do not have a Rheumatologist. Pt will need a new referral

## 2025-03-05 NOTE — TELEPHONE ENCOUNTER
Pt returning call, provided Rheumatologist information.     Marisela Nunez  [address]  [address]  [place] [address]  Tel: [phone]  Fax: [phone]     Please call pt back once referral is sent.   Please advise, thank you.

## 2025-05-14 ENCOUNTER — OFFICE VISIT (OUTPATIENT)
Dept: PODIATRY | Age: 65
End: 2025-05-14
Payer: COMMERCIAL

## 2025-05-14 VITALS — WEIGHT: 134.6 LBS | BODY MASS INDEX: 26.42 KG/M2 | HEIGHT: 60 IN

## 2025-05-14 DIAGNOSIS — M20.5X2 ACQUIRED ADDUCTOVARUS ROTATION OF TOE OF LEFT FOOT: ICD-10-CM

## 2025-05-14 DIAGNOSIS — L84 CALLUS: Primary | ICD-10-CM

## 2025-05-14 PROCEDURE — 99213 OFFICE O/P EST LOW 20 MIN: CPT | Performed by: STUDENT IN AN ORGANIZED HEALTH CARE EDUCATION/TRAINING PROGRAM

## 2025-05-14 NOTE — PROGRESS NOTES
Name: Chidi Peterson      : 1960      MRN: [id number]  Encounter Provider: Karen Gomes DPM  Encounter Date: 2025   Encounter department: [facility] [place]  :  Assessment & Plan  Callus         Acquired adductovarus rotation of toe of left foot              PLAN:  Left 3rd toe pinch callus from adductovarus deformity. I pared in thickness to slightly macerated but intact epithelium.   Rec betadine for one week  Daily donut pad or silicone toe spacer. May need to wear for rest of life. Can consider surgery PRN  Soft/wide toe box in well fit shoes  F/u PRN    History of Present Illness   HPI  Chidi Peterson is a 65 y.o. female who presents to clinic for left 3rd toe pain and bony protuberance. Hurts when up and on it. Present since 2025. No trauma.       Review of Systems   Constitutional:  Negative for activity change, chills and fever.   HENT: Negative.     Respiratory:  Negative for cough, chest tightness and shortness of breath.    Cardiovascular:  Negative for chest pain and leg swelling.   Endocrine: Negative.    Genitourinary: Negative.    Neurological: Negative.  Negative for numbness.   Psychiatric/Behavioral: Negative.  Negative for agitation and behavioral problems.           Objective   Ht 5' (1.524 m)   Wt 61.1 kg (134 lb 9.6 oz)   BMI 26.29 kg/m²      Physical Exam  Vitals and nursing note reviewed.   Constitutional:       General: She is not in acute distress.     Appearance: She is well-developed.   Pulmonary:      Effort: Pulmonary effort is normal. No respiratory distress.     Musculoskeletal:         General: Tenderness (Right 3rd toe medial DIPJ pinch callus) and deformity (R3 adductovarus) present. No swelling.     Skin:     General: Skin is warm and dry.      Capillary Refill: Capillary refill takes less than 2 seconds.      Comments: R3 medial DIPJ pinch callus     Neurological:      Mental Status: She is alert.     Psychiatric:         Mood  and Affect: Mood normal.

## 2025-06-26 ENCOUNTER — VBI (OUTPATIENT)
Dept: ADMINISTRATIVE | Facility: OTHER | Age: 65
End: 2025-06-26

## 2025-06-26 NOTE — TELEPHONE ENCOUNTER
06/26/25 1:48 PM     Chart reviewed for [facility]: Colonoscopy ; nothing is submitted to the patient's insurance at this time.     Chidi Kemp MA   [facility]

## 2025-07-23 ENCOUNTER — OFFICE VISIT (OUTPATIENT)
Dept: FAMILY MEDICINE CLINIC | Facility: CLINIC | Age: 65
End: 2025-07-23
Payer: COMMERCIAL

## 2025-07-23 VITALS
HEIGHT: 60 IN | HEART RATE: 76 BPM | RESPIRATION RATE: 16 BRPM | TEMPERATURE: 98.1 F | DIASTOLIC BLOOD PRESSURE: 84 MMHG | WEIGHT: 136 LBS | SYSTOLIC BLOOD PRESSURE: 126 MMHG | BODY MASS INDEX: 26.7 KG/M2
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
## 2025-07-23 DIAGNOSIS — S50.862A INSECT BITE OF LEFT FOREARM, INITIAL ENCOUNTER: ICD-10-CM

## 2025-07-23 DIAGNOSIS — L03.114 CELLULITIS OF LEFT UPPER EXTREMITY: Primary | ICD-10-CM

## 2025-07-23 PROCEDURE — 99214 OFFICE O/P EST MOD 30 MIN: CPT | Performed by: FAMILY MEDICINE

## 2025-07-23 RX ORDER — BETAMETHASONE DIPROPIONATE 0.5 MG/G
CREAM TOPICAL 2 TIMES DAILY
Qty: 45 G | Refills: 1 | Status: SHIPPED | OUTPATIENT
Start: 2025-07-23

## 2025-07-23 RX ORDER — DOXYCYCLINE HYCLATE 100 MG
100 TABLET ORAL 2 TIMES DAILY
Qty: 14 TABLET | Refills: 0 | Status: SHIPPED | OUTPATIENT
Start: 2025-07-23 | End: 2025-07-30

## 2025-07-23 NOTE — PROGRESS NOTES
Name: Chidi Peterson      : 1960      MRN: [id number]  Encounter Provider: Skyler eLzama DO  Encounter Date: 2025   [facility]  :  Assessment & Plan  Cellulitis of left upper extremity  The reddened area with a center revealing a yellow draining fluid.  Consistent with cellulitis most likely caused by an insect bite  Orders:  •  doxycycline hyclate (VIBRA-TABS) 100 mg tablet; Take 1 tablet (100 mg total) by mouth 2 (two) times a day for 7 days  •  betamethasone dipropionate (DIPROSONE) 0.05 % cream; Apply topically 2 (two) times a day    Insect bite of left forearm, initial encounter  As above              History of Present Illness   Patient presents with a complaint of a lump with yellow crusty center the lump is reddened and appeared within the last 24 hours    Wound Check      Review of Systems   Constitutional:  Negative for chills and fever.   HENT:  Negative for ear pain and sore throat.    Eyes:  Negative for pain and visual disturbance.   Respiratory:  Negative for cough and shortness of breath.    Cardiovascular:  Negative for chest pain and palpitations.   Gastrointestinal:  Negative for abdominal pain and vomiting.   Genitourinary:  Negative for dysuria and hematuria.   Musculoskeletal:  Negative for arthralgias and back pain.   Skin:  Negative for color change and rash.   Neurological:  Negative for seizures and syncope.   All other systems reviewed and are negative.      Objective   /84   Pulse 76   Temp 98.1 °F (36.7 °C)   Resp 16   Ht 5' (1.524 m)   Wt 61.7 kg (136 lb)   BMI 26.56 kg/m²      Physical Exam  Constitutional:       Appearance: Normal appearance.   HENT:      Head: Normocephalic and atraumatic.      Right Ear: Tympanic membrane, ear canal and external ear normal.      Left Ear: Tympanic membrane, ear canal and external ear normal.      Nose: Nose normal.      Mouth/Throat:      Mouth: Mucous membranes are  moist.      Pharynx: Oropharynx is clear.     Eyes:      Extraocular Movements: Extraocular movements intact.      Conjunctiva/sclera: Conjunctivae normal.      Pupils: Pupils are equal, round, and reactive to light.       Cardiovascular:      Rate and Rhythm: Normal rate and regular rhythm.      Pulses: Normal pulses.      Heart sounds: Normal heart sounds.   Pulmonary:      Effort: Pulmonary effort is normal.      Breath sounds: Normal breath sounds.   Abdominal:      General: Abdomen is flat. Bowel sounds are normal.      Palpations: Abdomen is soft.     Musculoskeletal:         General: Normal range of motion.      Cervical back: Normal range of motion and neck supple.     Skin:     General: Skin is warm and dry.           Comments: Area of redness and swelling     Neurological:      General: No focal deficit present.      Mental Status: She is alert and oriented to person, place, and time.     Psychiatric:         Mood and Affect: Mood normal.         Behavior: Behavior normal.

## 2025-07-28 ENCOUNTER — HOSPITAL ENCOUNTER (OUTPATIENT)
Dept: MAMMOGRAPHY | Facility: HOSPITAL | Age: 65
Discharge: HOME | End: 2025-07-28
Attending: OBSTETRICS & GYNECOLOGY
Payer: COMMERCIAL

## 2025-07-28 DIAGNOSIS — Z12.31 ENCOUNTER FOR SCREENING MAMMOGRAM FOR MALIGNANT NEOPLASM OF BREAST: ICD-10-CM

## 2025-07-28 PROCEDURE — 77063 BREAST TOMOSYNTHESIS BI: CPT

## 2025-07-28 PROCEDURE — 77067 SCR MAMMO BI INCL CAD: CPT

## (undated) DEVICE — GAUZE SPONGES,16 PLY: Brand: CURITY

## (undated) DEVICE — 3M™ STERI-STRIP™ REINFORCED ADHESIVE SKIN CLOSURES, R1546, 1/4 IN X 4 IN (6 MM X 100 MM), 10 STRIPS/ENVELOPE: Brand: 3M™ STERI-STRIP™

## (undated) DEVICE — GLOVE SRG BIOGEL ECLIPSE 8

## (undated) DEVICE — GLOVE SRG BIOGEL ECLIPSE 7

## (undated) DEVICE — GLOVE INDICATOR PI UNDERGLOVE SZ 8 BLUE

## (undated) DEVICE — INTENDED FOR TISSUE SEPARATION, AND OTHER PROCEDURES THAT REQUIRE A SHARP SURGICAL BLADE TO PUNCTURE OR CUT.: Brand: BARD-PARKER SAFETY BLADES SIZE 15, STERILE

## (undated) DEVICE — NEEDLE 25G X 1 1/2

## (undated) DEVICE — GLOVE INDICATOR PI UNDERGLOVE SZ 7 BLUE

## (undated) DEVICE — MEDI-VAC YANKAUER SUCTION HANDLE W/STRAIGHT TIP & CONTROL VENT: Brand: CARDINAL HEALTH

## (undated) DEVICE — ABDOMINAL PAD: Brand: DERMACEA

## (undated) DEVICE — PLUMEPEN PRO 10FT

## (undated) DEVICE — BETHLEHEM UNIVERSAL MINOR GEN: Brand: CARDINAL HEALTH

## (undated) DEVICE — TUBING SUCTION 5MM X 12 FT